# Patient Record
Sex: FEMALE | Race: ASIAN | Employment: UNEMPLOYED | URBAN - METROPOLITAN AREA
[De-identification: names, ages, dates, MRNs, and addresses within clinical notes are randomized per-mention and may not be internally consistent; named-entity substitution may affect disease eponyms.]

---

## 2017-03-09 NOTE — PROGRESS NOTES
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 She completed fourth cycle of BEP 12/2015. She presents today for a three month surveillance visit.    HPI: Dianna comes to the clinic feeling well accompanied by her boyfriend. She continues to have mild intermittent tinnitus which is not bothersome to her. She is sexually active with her boyfriend and uses a copper IUD for contraception, denies dyspareunia or post-coital bleeding. She reports normal periods with the exception of one missed period- took a pregnancy test and it was negative. She notes \"breast irritation and thickening\" to her left breast which she first started noticing this past month when she switched from underwire bras to \"breastfeeding bras\" that do not have the same support. Denies family history of breast cancer, spontaneous nipple discharge, breast pain, or breast lumps. Reports that her breasts are asymmetrical but this is not new for her.  Denies unintended weight loss, fatigue, weakness, changes in vision or hearing, shortness of breath, cough, chest pain, abdominal pain, dyspepsia, nausea, vomiting, constipation, diarrhea, bloating, dysuria, urinary frequency or urgency, hematuria, pelvic pain, lower back pain, vaginal discharge,numbness or tingling, or swelling of the extremities.     Brief Oncology History:  GYN History:  7/30/15: She was seen in the ED due to pelvic pain. History of irregular vaginal bleeding with positive pregnancy test. Further work up with US remarkable for 20cm pelvic mass    8/12/2015 CT scan:  Pelvic mass approximately 21 cm transverse, 14 cm AP and 20 cm craniocaudal dimension. This has cystic or necrotic areas within it with thick heterogeneous soft tissue rim.This is felt to be a large tumor, probably right ovarian origin. There is an approximately 6 x 5 x 6 cm left adnexal heterogeneous mass consistent with a smaller left ovarian mass " suspicious for tumor as well. Retroperitoneal adenopathy. Small amount of free fluid in the  pelvis. No pelvic adenopathy    8/12/2015 Pelvic US  The uterus measures 6.6 x 4.2 x 4.7cm. No fibroids are present. Endometrial stripe thickness is 0.6 cm. Remaining findings as above in CT scan.    8/15/15: CEA (1.8), CA-125 ( 202), LDH (2340), hCG (51), AFP (7,592)    8/14/15: CT abdomen and pelvis   IMPRESSION:   1. Very large right abdomen and pelvis and smaller left adnexal region mass with retroperitoneal adenopathy and small ascites. Findings are compatible with malignancy, likely bilateral ovarian tumor, greater on  the right. [Critical Result: Large mid to right abdominal mass and smaller 6 cm left adnexal mass concerning for ovarian malignancy with small free fluid and periaortic adenopathy concerning for metastasis.]    8/24/15: exploratory laparotomy, tumor debulking, left salpingo-oophorectomy, complete omentectomy, left pelvic and para aortic lymph node dissection, and bladder peritoneum stripping  Cytology:   CYTOLOGIC INTERPRETATION:  A. Pelvic Washings: Positive for Malignancy  - Consistent with germ cell tumor (dysgerminoma)Specimen Adequacy: Satisfactory for evaluation.  B. Diaphragm fluid, Left Washings: Negative for malignancy Specimen Adequacy: Satisfactory for evaluation.  C. Diaphragm fluid, Right Washings: Negative for malignancy Specimen Adequacy: Satisfactory for evaluation.  Pathology:  SPECIMEN(S):  A: Bladder peritoneum  B: Left ovary and fallopian tube  C: Left fallopian tube  D: Left pelvic peritoneum  E: Omentum  F: Omental nodule  G: Lymph nodes, left periaortic  H: Lymph node, left infrarenal  I: Lymph nodes, left pelvic  J: Right bladder peritoneum nodule  K: Cul-de-sac biopsy, posterior  FINAL DIAGNOSIS:  A. Peritoneum, bladder, excision:  -Metastatic malignant germ cell tumor (fragmented, 13.1 cm in aggregate)  B. 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 Fallopian tube, left, salpingectomy:  -Smooth muscle and mesothelium  -No fallopian tube identified  -Uninvolved by tumor  D. Peritoneum, left pelvic, biopsy:  -Metastatic malignant germ cell tumor  E. Omentum, omentectomy:  -Metastatic malignant germ cell tumor, size 2 mm  F. Omentum, nodule, excision:  -Metastatic malignant germ cell tumor  -Fragmented specimen (largest intact piece of tumor: 1 cm)  G. Lymph nodes, left periaortic, dissection:  -Metastatic malignant germ cell tumor in four of ten lymph nodes (4/10)  -Largest metastasis is 12 mm in greatest dimension  -Extranodal extension present  -Malignant germ cell tumor also present as detached tumor fragments, without identifiable lymph node  H. Lymph nodes, left infrarenal, dissection:  -Metastatic malignant germ cell tumor in two of five lymph nodes (2/5)  -Larger metastasis is 7 mm in greatest dimension  -Extranodal extension present  I. Lymph nodes, left pelvic, dissection:  -Metastatic malignant germ cell tumor in one of six lymph nodes (1/6)  -Metastasis is 4.5 mm in greatest dimension  J. Peritoneum, right bladder, biopsy:  -Metastatic malignant germ cell tumor (size 11 mm)  K. Peritoneum, cul de sac, biopsy:  -Metastatic malignant germ cell tumor  COMMENT:  The ovarian tumor shows a mixture of dysgerminoma (70%) and yolk sac tumor (30%). Similarly, the omental, peritoneal and lymph node metastases show both components, although yolk sac tumor composes the majority of the metastatic tumor volume. Neither embryonal carcinoma nor choriocarcinoma is identified.        Results for FREDERICK LEWIS (MRN [id number]) as of 11/13/2015 12:22   Ref. Range  8/15/2015 08:35  9/8/2015 13:25  9/14/2015 08:08  10/5/2015 08:20  10/26/2015  11/16/2015  12/14/2015      Latest Range: 0-30 U/mL   202 (H)  62 (H)  53 (H)  14  12  14  11      Results for FREDERICK LEWIS (MRN [id number]) as of 2015 12:22   Ref. Range  8/15/2015 08:35  2015 13:25  2015 08:08  10/5/2015 08:20  10/26/15  11/16/15  12/908083    Alpha Fetoprotein  Latest Range: 0-8 ug/L  7592.7 (H)  254.5 (H)  241.1 (H)  75.0 (H)  6.1  3.3  2.8        9/14/15 Cycle 1 BEP   9/29/15 Cycle 2 BEP  10/5/15: C2D5 Etoposide cisplatin, bleomycin   10/19/15: C2D15 bleomycin  10/21/15: C2D17 BEP, doing well  11/13/15: C3D17 BEP  11/23/15: C4D1 BEP   11/30/15: C4 D15 BEP  CT c/a/p  IMPRESSION:   1. Postsurgical changes status post removal of the mixed germ cell  tumor and left salpingo-oophorectomy with lymph node dissection as  above.  2. Unchanged left apical 4 mm groundglass pleural-based nodule. No  definite evidence of recurrence or metastasis in chest, abdomen, or pelvis    3/17/16: Surveillance visit. Pap NIL. , AFP 2.9,  8, and beta HCG <3  16: AFP 2.8, beta HCG <3,  10,   16: AFP 2.4, beta HCG <3,  10,   16: AFP 2.1, beta HCG <3,  18; , Pap NIL, HPV negative  3/10/17: , AFP pending, beta HCG pending,  pending    Past Medical History   Diagnosis Date     Contact dermatitis and other eczema, due to unspecified cause      Other  infants, unspecified (weight)(765.10)      born about 35 week - in NICU for 2 weeks - breathing issues.      Pelvic mass        Past Surgical History   Procedure Laterality Date     Surgical history of -        no surgeries     Laparotomy, tumor debulking, combined N/A 2015     Procedure: COMBINED LAPAROTOMY, TUMOR DEBULKING;  Surgeon: Vita Jordan MD;  Location: [facility] OR     Salpingo-oophorectomy, dissect lymph node Left 2015     Procedure: SALPINGO-OOPHORECTOMY, DISSECT LYMPH NODE;  Surgeon: Vita Jordan MD;  Location: [facility] OR     Omentectomy N/A 2015     Procedure: OMENTECTOMY;  Surgeon: Vita Jordan MD;   Location:  OR       Social History     Social History     Marital status: Single     Spouse name: N/A     Number of children: N/A     Years of education: N/A     Occupational History     Not on file.     Social History Main Topics     Smoking status: Never Smoker     Smokeless tobacco: Never Used     Alcohol use 0.0 oz/week     0 Standard drinks or equivalent per week      Comment: occasional     Drug use: No     Sexual activity: Yes     Partners: Male     Other Topics Concern     Not on file     Social History Narrative       Family History   Problem Relation Age of Onset     C.A.D. No family hx of      Hypertension No family hx of      Asthma No family hx of      Breast Cancer No family hx of      Cancer - colorectal No family hx of      Blood Disease No family hx of      no DVT/PE      Anesthesia Reaction No family hx of      DIABETES Father        Current Outpatient Prescriptions   Medication     UNABLE TO FIND     No current facility-administered medications for this visit.           Allergies   Allergen Reactions     Cats          ROS  Review of Systems:  General: Denies fatigue, changes in weight, weakness, appetite changes, night sweats, hot flashes, fever, chills, or difficulty sleeping  HEENT: Reports tinnitus. Denies headaches, hair loss, visual difficulty or disturbances, spots or floaters, diplopia, masses, head injury, tinnitus, hearing loss, epistaxis, congestion, problems with teeth or gums, dysphonia, or dysphagia  Breast: See HPI.  Pulmonary: Denies cough, sputum, hemoptysis, shortness of breath, dyspnea on exertion, wheezing, or allergies  Cardiovascular: Denies chest pain, fainting, palpitations, murmurs, activity intolerance, swelling in legs, or high blood pressure  Gastrointestinal: Denies nausea, vomiting, constipation, diarrhea, abdominal pain, bloating, heartburn, melena, hematochezia, or jaundice  Genitourinary: Denies dysuria, urinary urgency or frequency, hematuria, cloudy or  "malodorous urine, incontinence, repeat urinary tract infections, flank pain, pelvic pain, irregular vaginal bleeding, vaginal discharge, or vaginal dryness  Sexual Function: Denies pain with intercourse, changes in libido, arousal difficulty, or changes in orgasm  Integumentary: Denies rashes, sores, changing moles, or scarring  Hematologic: Denies swollen lymph nodes, masses, easy bruising, or easy bleeding  Musculoskeletal: Denies falls, back pain, myalgias, arthralgias, stiffness, muscle weakness or muscle cramps  Neurologic: Denies numbness or tingling, changes in memory, difficulty with walking, dizziness, seizures, or tremors  Psychiatric: Denies anxiety, depression, nervousness, mood changes, suicidal thoughts, or difficulty concentrating  Endocrine: Denies polydipsia, polyuria, temperature intolerance, or history of thyroid disease      OBJECTIVE:    Physical Exam:    /75  Pulse 104  Temp 97.9  F (36.6  C) (Oral)  Resp 24  Ht 1.575 m (5' 2.01\")  Wt 111.9 kg (246 lb 11.2 oz)  SpO2 96%  BMI 45.11 kg/m2    General: Alert non-toxic appearing female in no acute distress  HEENT: Normocephalic, atraumatic; PERRLA; no scleral icterus; oropharynx pink without lesions; neck supple without lymphadenopathy  Breast: Assessed in sitting and lying positions. Breasts large and pendulous, R>L. No axillary lymphadenopathy, no lumps, areas of thickening, peau d'orange, nipple retraction, or erythema, no spontaneous nipple discharge.   Pulmonary: Lungs clear to auscultation, no increased work of breathing noted  CV/PV: Regular rate and rhythm, S1S2, no clicks, murmurs, rubs, or gallops; bilateral lower extremities without edema  GI: Normoactive bowel sounds x4 quadrants, abdomen obese, soft, non-distended, and non-tender to palpation without masses or organomegaly  : External genitalia and urethral meatus pink without lesions, masses, or excoriation. Vagina pink and moist without masses or lesions. Cervix without masses " or polyps, os patent with clear discharge and visible IUD strings. Bimanual exam reveals no masses, fullness, or cervical motion tenderness. Rectovaginal exam confirms these findings.  Heme: Cervical, supraclavicular, and inguinal lymph nodes without lymphadenopathy  Neuro: Grossly intact, normal gait  Skin: Appropriate color for race, warm and dry, no rashes or lesions to unclothed skin  Psych: Pleasant and interactive, affect bright, makes appropriate eye contact, thought process linear    Labs:    AFP pending   pending  Beta HCG tumor marker pending      Assessment/Plan:  1) Stage IIIC mixed germ cell tumor, left ovary: No signs of recurrence on physical exam or history, tumor markers pending. Continue surveillance every three months x2 years (12/2017) followed by yearly surveillance visits through 12/2020 per NCCN guidelines. Reviewed signs and symptoms  of recurrence including but not limited to bleeding from vagina, bladder, or rectum, bloating, early satiety, swelling in the lower extremities, abdominal or lower back pain, changes in bowel or bladder patterns, shortness of breath, increased fatigue, unexplained weight loss, and night sweats. Reviewed signs and symptoms for when she should contact the clinic or seek additional care. Patient to contact the clinic with any questions or concerns in the interim.  2) Breast concerns: No palpable masses or concerning skin lesions on exam. We discussed importance of breast self awareness. Discussed that ill-fitting bras may cause breast discomfort and irritation- recommend that she return to wearing a supportive bra and monitor her symptoms. If she continues to have symptoms or the region she is concerned about does not improve within two weeks, she is to call the clinic and a mammogram will be ordered.  3) Genetic testing: Does not meet criteria for testing  4) Labs: LDH, AFP, , beta HCG tumor marker  5) Health maintenance issues discussed today  include to follow up with PCP for co-morbid conditions and non-gynecologic issues.  6) Patient verbalized understanding of and agreement with plan.    20 minutes face to face time spent with patient, over 50% of which was spent in counseling and coordination of care.    WEN Reagan, FNP-C  Family Nurse Practitioner  Gynecologic Oncology  [facility]  Pager: [phone]

## 2017-03-10 ENCOUNTER — ONCOLOGY VISIT (OUTPATIENT)
Dept: ONCOLOGY | Facility: CLINIC | Age: 22
End: 2017-03-10
Attending: NURSE PRACTITIONER
Payer: COMMERCIAL

## 2017-03-10 VITALS
RESPIRATION RATE: 24 BRPM | HEIGHT: 62 IN | BODY MASS INDEX: 45.4 KG/M2 | WEIGHT: 246.7 LBS | SYSTOLIC BLOOD PRESSURE: 120 MMHG | DIASTOLIC BLOOD PRESSURE: 75 MMHG | HEART RATE: 104 BPM | TEMPERATURE: 97.9 F | OXYGEN SATURATION: 96 %

## 2017-03-10 DIAGNOSIS — C56.2 OVARIAN CANCER, LEFT (H): ICD-10-CM

## 2017-03-10 DIAGNOSIS — N64.59 BREAST COMPLAINT: ICD-10-CM

## 2017-03-10 DIAGNOSIS — C80.1 MIXED GERM CELL TUMOR (H): Primary | ICD-10-CM

## 2017-03-10 LAB
AFP SERPL-MCNC: 3.7 UG/L (ref 0–8)
CANCER AG125 SERPL-ACNC: 12 U/ML (ref 0–30)
LDH SERPL L TO P-CCNC: 235 U/L (ref 81–234)

## 2017-03-10 PROCEDURE — 83615 LACTATE (LD) (LDH) ENZYME: CPT | Performed by: NURSE PRACTITIONER

## 2017-03-10 PROCEDURE — 84702 CHORIONIC GONADOTROPIN TEST: CPT | Performed by: NURSE PRACTITIONER

## 2017-03-10 PROCEDURE — 86304 IMMUNOASSAY TUMOR CA 125: CPT | Performed by: NURSE PRACTITIONER

## 2017-03-10 PROCEDURE — 82105 ALPHA-FETOPROTEIN SERUM: CPT | Performed by: NURSE PRACTITIONER

## 2017-03-10 PROCEDURE — 36415 COLL VENOUS BLD VENIPUNCTURE: CPT

## 2017-03-10 PROCEDURE — 99212 OFFICE O/P EST SF 10 MIN: CPT | Mod: ZF

## 2017-03-10 PROCEDURE — 99213 OFFICE O/P EST LOW 20 MIN: CPT | Mod: ZP | Performed by: NURSE PRACTITIONER

## 2017-03-10 ASSESSMENT — PAIN SCALES - GENERAL: PAINLEVEL: NO PAIN (0)

## 2017-03-10 NOTE — NURSING NOTE
"Dianna Vasquez is a 21 year old female who presents for:  Chief Complaint   Patient presents with     Blood Draw     Pt with hx of mixed germ cell tumor labs collected with U/S via vascular access.      Oncology Clinic Visit     return patient visit related to mixed germ cell tumor         Initial Vitals:  /75  Pulse 104  Temp 97.9  F (36.6  C) (Oral)  Resp 24  Ht 1.575 m (5' 2.01\")  Wt 111.9 kg (246 lb 11.2 oz)  SpO2 96%  BMI 45.11 kg/m2 Estimated body mass index is 45.11 kg/(m^2) as calculated from the following:    Height as of this encounter: 1.575 m (5' 2.01\").    Weight as of this encounter: 111.9 kg (246 lb 11.2 oz).. Body surface area is 2.21 meters squared. BP completed using cuff size: large  No Pain (0) No LMP recorded. Allergies and medications reviewed.     Medications: Medication refills not needed today.  Pharmacy name entered into [facility]:    [facility] - [place], [place] - [address] [place] [address] AT [facility] OF [address] [place] [address] [facility] [place], [place] - [address] [facility] - [place], [place] - [address] [facility] - MAIL ORDER ONLY - [place], [place]    Comments: patient denied pain/discomfort.     5 minutes for nursing intake (face to face time)   Isai Arias CMA      "

## 2017-03-10 NOTE — MR AVS SNAPSHOT
After Visit Summary   3/10/2017    Dianna Vasquez    MRN: [id number]           Patient Information     Date Of Birth          1995        Visit Information        Provider Department      3/10/2017 1:20 PM Rosita Waggoner APRN CNP [facility]        Today's Diagnoses     Mixed germ cell tumor    -  1    Ovarian cancer, left (H)        Breast complaint           Follow-ups after your visit        Your next 10 appointments already scheduled     Jun 02, 2017 12:30 PM CDT   [facility] Draw with [facility] LAB DRAW   [facility] Lab Draw ([facility])    [address] [place] [address] [id number] [phone]            Jun 02, 2017  1:00 PM CDT   (Arrive by 12:45 PM)   Return Visit with WEN Coello CNP   [facility] ([facility])    [address] [place] [address] [id number] [phone]              Who to contact     If you have questions or need follow up information about today's clinic visit or your schedule please contact [facility] directly at [phone].  Normal or non-critical lab and imaging results will be communicated to you by MyChart, letter or phone within 4 business days after the clinic has received the results. If you do not hear from us within 7 days, please contact the clinic through [employer] or phone. If you have a critical or abnormal lab result, we will notify you by phone as soon as possible.  Submit refill requests through [employer] or call your pharmacy and they will forward the refill request to us. Please allow 3 business days for your refill to be completed.          Additional Information About Your Visit        MyChart Information     [employer] gives you secure access to your electronic health record. If you see a primary care provider, you can also send messages to your care team and make appointments. If you have  "questions, please call your primary care clinic.  If you do not have a primary care provider, please call [phone] and they will assist you.        Care EveryWhere ID     This is your Care EveryWhere ID. This could be used by other organizations to access your [facility] medical records  [id number]        Your Vitals Were     Pulse Temperature Respirations Height Pulse Oximetry BMI (Body Mass Index)    104 97.9  F (36.6  C) (Oral) 24 1.575 m (5' 2.01\") 96% 45.11 kg/m2       Blood Pressure from Last 3 Encounters:   03/10/17 120/75   12/21/16 111/73   09/23/16 117/78    Weight from Last 3 Encounters:   03/10/17 111.9 kg (246 lb 11.2 oz)   12/21/16 106.9 kg (235 lb 9.6 oz)   09/23/16 107.7 kg (237 lb 8 oz)              We Performed the Following     AFP     B-hcg Tumor Marker          Lactate Dehydrogenase        Primary Care Provider Office Phone # Fax #    Kasey Nona Toure [phone]       [facility] [address] [facility] [address]        Thank you!     Thank you for choosing [facility]  for your care. Our goal is always to provide you with excellent care. Hearing back from our patients is one way we can continue to improve our services. Please take a few minutes to complete the written survey that you may receive in the mail after your visit with us. Thank you!             Your Updated Medication List - Protect others around you: Learn how to safely use, store and throw away your medicines at www.disposemymeds.org.          This list is accurate as of: 3/10/17  3:17 PM.  Always use your most recent med list.                   Brand Name Dispense Instructions for use    UNABLE TO FIND      Apply topically as needed MEDICATION NAME: quadriderm cream         "

## 2017-03-10 NOTE — LETTER
"3/10/2017       RE: Dianna Vasquez  [address]  [place] [address]     Dear Colleague,    Thank you for referring your patient, Dianna Vasquez, to the [facility]. Please see a copy of my visit note below.    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 She completed fourth cycle of BEP 12/2015. She presents today for a three month surveillance visit.    HPI: Dianna comes to the clinic feeling well accompanied by her boyfriend. She continues to have mild intermittent tinnitus which is not bothersome to her. She is sexually active with her boyfriend and uses a copper IUD for contraception, denies dyspareunia or post-coital bleeding. She reports normal periods with the exception of one missed period- took a pregnancy test and it was negative. She notes \"breast irritation and thickening\" to her left breast which she first started noticing this past month when she switched from underwire bras to \"breastfeeding bras\" that do not have the same support. Denies family history of breast cancer, spontaneous nipple discharge, breast pain, or breast lumps. Reports that her breasts are asymmetrical but this is not new for her.  Denies unintended weight loss, fatigue, weakness, changes in vision or hearing, shortness of breath, cough, chest pain, abdominal pain, dyspepsia, nausea, vomiting, constipation, diarrhea, bloating, dysuria, urinary frequency or urgency, hematuria, pelvic pain, lower back pain, vaginal discharge,numbness or tingling, or swelling of the extremities.     Brief Oncology History:  GYN History:  7/30/15: She was seen in the ED due to pelvic pain. History of irregular vaginal bleeding with positive pregnancy test. Further work up with US remarkable for 20cm pelvic mass    8/12/2015 CT scan:  Pelvic mass approximately 21 cm transverse, 14 cm AP and 20 cm craniocaudal dimension. This has cystic or necrotic areas " within it with thick heterogeneous soft tissue rim.This is felt to be a large tumor, probably right ovarian origin. There is an approximately 6 x 5 x 6 cm left adnexal heterogeneous mass consistent with a smaller left ovarian mass suspicious for tumor as well. Retroperitoneal adenopathy. Small amount of free fluid in the  pelvis. No pelvic adenopathy    8/12/2015 Pelvic US  The uterus measures 6.6 x 4.2 x 4.7cm. No fibroids are present. Endometrial stripe thickness is 0.6 cm. Remaining findings as above in CT scan.    8/15/15: CEA (1.8), CA-125 ( 202), LDH (2340), hCG (51), AFP (7,592)    8/14/15: CT abdomen and pelvis   IMPRESSION:   1. Very large right abdomen and pelvis and smaller left adnexal region mass with retroperitoneal adenopathy and small ascites. Findings are compatible with malignancy, likely bilateral ovarian tumor, greater on  the right. [Critical Result: Large mid to right abdominal mass and smaller 6 cm left adnexal mass concerning for ovarian malignancy with small free fluid and periaortic adenopathy concerning for metastasis.]    8/24/15: exploratory laparotomy, tumor debulking, left salpingo-oophorectomy, complete omentectomy, left pelvic and para aortic lymph node dissection, and bladder peritoneum stripping  Cytology:   CYTOLOGIC INTERPRETATION:  A. Pelvic Washings: Positive for Malignancy  - Consistent with germ cell tumor (dysgerminoma)Specimen Adequacy: Satisfactory for evaluation.  B. Diaphragm fluid, Left Washings: Negative for malignancy Specimen Adequacy: Satisfactory for evaluation.  C. Diaphragm fluid, Right Washings: Negative for malignancy Specimen Adequacy: Satisfactory for evaluation.  Pathology:  SPECIMEN(S):  A: Bladder peritoneum  B: Left ovary and fallopian tube  C: Left fallopian tube  D: Left pelvic peritoneum  E: Omentum  F: Omental nodule  G: Lymph nodes, left periaortic  H: Lymph node, left infrarenal  I: Lymph nodes, left pelvic  J: Right bladder peritoneum nodule  K:  Cul-de-sac biopsy, posterior  FINAL DIAGNOSIS:  A. Peritoneum, bladder, excision:  -Metastatic malignant germ cell tumor (fragmented, 13.1 cm in aggregate)  B. 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 Fallopian tube, left, salpingectomy:  -Smooth muscle and mesothelium  -No fallopian tube identified  -Uninvolved by tumor  D. Peritoneum, left pelvic, biopsy:  -Metastatic malignant germ cell tumor  E. Omentum, omentectomy:  -Metastatic malignant germ cell tumor, size 2 mm  F. Omentum, nodule, excision:  -Metastatic malignant germ cell tumor  -Fragmented specimen (largest intact piece of tumor: 1 cm)  G. Lymph nodes, left periaortic, dissection:  -Metastatic malignant germ cell tumor in four of ten lymph nodes (4/10)  -Largest metastasis is 12 mm in greatest dimension  -Extranodal extension present  -Malignant germ cell tumor also present as detached tumor fragments, without identifiable lymph node  H. Lymph nodes, left infrarenal, dissection:  -Metastatic malignant germ cell tumor in two of five lymph nodes (2/5)  -Larger metastasis is 7 mm in greatest dimension  -Extranodal extension present  I. Lymph nodes, left pelvic, dissection:  -Metastatic malignant germ cell tumor in one of six lymph nodes (1/6)  -Metastasis is 4.5 mm in greatest dimension  J. Peritoneum, right bladder, biopsy:  -Metastatic malignant germ cell tumor (size 11 mm)  K. Peritoneum, cul de sac, biopsy:  -Metastatic malignant germ cell tumor  COMMENT:  The ovarian tumor shows a mixture of dysgerminoma (70%) and yolk sac tumor (30%). Similarly, the omental, peritoneal and lymph node metastases show both components, although yolk sac tumor composes the majority of the metastatic tumor volume. Neither embryonal carcinoma nor choriocarcinoma is  identified.        Results for FREDERICK LEWIS (MRN [id number]) as of 2015 12:22   Ref. Range  8/15/2015 08:35  2015 13:25  2015 08:08  10/5/2015 08:20  10/26/2015  2015  2015      Latest Range: 0-30 U/mL  202 (H)  62 (H)  53 (H)  14  12  14  11      Results for FREDERICK LEWIS (MRN [id number]) as of 2015 12:22   Ref. Range  8/15/2015 08:35  2015 13:25  2015 08:08  10/5/2015 08:20  10/26/15  11/16/15  12/026240    Alpha Fetoprotein  Latest Range: 0-8 ug/L  7592.7 (H)  254.5 (H)  241.1 (H)  75.0 (H)  6.1  3.3  2.8        9/14/15 Cycle 1 BEP   9/29/15 Cycle 2 BEP  10/5/15: C2D5 Etoposide cisplatin, bleomycin   10/19/15: C2D15 bleomycin  10/21/15: C2D17 BEP, doing well  11/13/15: C3D17 BEP  11/23/15: C4D1 BEP   11/30/15: C4 D15 BEP  CT c/a/p  IMPRESSION:   1. Postsurgical changes status post removal of the mixed germ cell  tumor and left salpingo-oophorectomy with lymph node dissection as  above.  2. Unchanged left apical 4 mm groundglass pleural-based nodule. No  definite evidence of recurrence or metastasis in chest, abdomen, or pelvis    3/17/16: Surveillance visit. Pap NIL. , AFP 2.9,  8, and beta HCG <3  16: AFP 2.8, beta HCG <3,  10,   16: AFP 2.4, beta HCG <3,  10,   16: AFP 2.1, beta HCG <3,  18; , Pap NIL, HPV negative  3/10/17: , AFP pending, beta HCG pending,  pending    Past Medical History   Diagnosis Date     Contact dermatitis and other eczema, due to unspecified cause      Other  infants, unspecified (weight)(765.10)      born about 35 week - in NICU for 2 weeks - breathing issues.      Pelvic mass        Past Surgical History   Procedure Laterality Date     Surgical history of -        no surgeries     Laparotomy, tumor debulking, combined N/A 2015     Procedure: COMBINED LAPAROTOMY, TUMOR DEBULKING;  Surgeon: Vita Jordan MD;  Location: [facility] OR     Salpingo-oophorectomy,  dissect lymph node Left 8/24/2015     Procedure: SALPINGO-OOPHORECTOMY, DISSECT LYMPH NODE;  Surgeon: Vita Jordan MD;  Location: [facility] OR     Omentectomy N/A 8/24/2015     Procedure: OMENTECTOMY;  Surgeon: Vita Jordan MD;  Location: [facility] OR       Social History     Social History     Marital status: Single     Spouse name: N/A     Number of children: N/A     Years of education: N/A     Occupational History     Not on file.     Social History Main Topics     Smoking status: Never Smoker     Smokeless tobacco: Never Used     Alcohol use 0.0 oz/week     0 Standard drinks or equivalent per week      Comment: occasional     Drug use: No     Sexual activity: Yes     Partners: Male     Other Topics Concern     Not on file     Social History Narrative       Family History   Problem Relation Age of Onset     C.A.D. No family hx of      Hypertension No family hx of      Asthma No family hx of      Breast Cancer No family hx of      Cancer - colorectal No family hx of      Blood Disease No family hx of      no DVT/PE      Anesthesia Reaction No family hx of      DIABETES Father        Current Outpatient Prescriptions   Medication     UNABLE TO FIND     No current facility-administered medications for this visit.           Allergies   Allergen Reactions     Cats          ROS  Review of Systems:  General: Denies fatigue, changes in weight, weakness, appetite changes, night sweats, hot flashes, fever, chills, or difficulty sleeping  HEENT: Reports tinnitus. Denies headaches, hair loss, visual difficulty or disturbances, spots or floaters, diplopia, masses, head injury, tinnitus, hearing loss, epistaxis, congestion, problems with teeth or gums, dysphonia, or dysphagia  Breast: See HPI.  Pulmonary: Denies cough, sputum, hemoptysis, shortness of breath, dyspnea on exertion, wheezing, or allergies  Cardiovascular: Denies chest pain, fainting, palpitations, murmurs, activity intolerance, swelling in legs, or high blood  "pressure  Gastrointestinal: Denies nausea, vomiting, constipation, diarrhea, abdominal pain, bloating, heartburn, melena, hematochezia, or jaundice  Genitourinary: Denies dysuria, urinary urgency or frequency, hematuria, cloudy or malodorous urine, incontinence, repeat urinary tract infections, flank pain, pelvic pain, irregular vaginal bleeding, vaginal discharge, or vaginal dryness  Sexual Function: Denies pain with intercourse, changes in libido, arousal difficulty, or changes in orgasm  Integumentary: Denies rashes, sores, changing moles, or scarring  Hematologic: Denies swollen lymph nodes, masses, easy bruising, or easy bleeding  Musculoskeletal: Denies falls, back pain, myalgias, arthralgias, stiffness, muscle weakness or muscle cramps  Neurologic: Denies numbness or tingling, changes in memory, difficulty with walking, dizziness, seizures, or tremors  Psychiatric: Denies anxiety, depression, nervousness, mood changes, suicidal thoughts, or difficulty concentrating  Endocrine: Denies polydipsia, polyuria, temperature intolerance, or history of thyroid disease      OBJECTIVE:    Physical Exam:    /75  Pulse 104  Temp 97.9  F (36.6  C) (Oral)  Resp 24  Ht 1.575 m (5' 2.01\")  Wt 111.9 kg (246 lb 11.2 oz)  SpO2 96%  BMI 45.11 kg/m2    General: Alert non-toxic appearing female in no acute distress  HEENT: Normocephalic, atraumatic; PERRLA; no scleral icterus; oropharynx pink without lesions; neck supple without lymphadenopathy  Breast: Assessed in sitting and lying positions. Breasts large and pendulous, R>L. No axillary lymphadenopathy, no lumps, areas of thickening, peau d'orange, nipple retraction, or erythema, no spontaneous nipple discharge.   Pulmonary: Lungs clear to auscultation, no increased work of breathing noted  CV/PV: Regular rate and rhythm, S1S2, no clicks, murmurs, rubs, or gallops; bilateral lower extremities without edema  GI: Normoactive bowel sounds x4 quadrants, abdomen obese, soft, " non-distended, and non-tender to palpation without masses or organomegaly  : External genitalia and urethral meatus pink without lesions, masses, or excoriation. Vagina pink and moist without masses or lesions. Cervix without masses or polyps, os patent with clear discharge and visible IUD strings. Bimanual exam reveals no masses, fullness, or cervical motion tenderness. Rectovaginal exam confirms these findings.  Heme: Cervical, supraclavicular, and inguinal lymph nodes without lymphadenopathy  Neuro: Grossly intact, normal gait  Skin: Appropriate color for race, warm and dry, no rashes or lesions to unclothed skin  Psych: Pleasant and interactive, affect bright, makes appropriate eye contact, thought process linear    Labs:    AFP pending   pending  Beta HCG tumor marker pending      Assessment/Plan:  1) Stage IIIC mixed germ cell tumor, left ovary: No signs of recurrence on physical exam or history, tumor markers pending. Continue surveillance every three months x2 years (12/2017) followed by yearly surveillance visits through 12/2020 per NCCN guidelines. Reviewed signs and symptoms  of recurrence including but not limited to bleeding from vagina, bladder, or rectum, bloating, early satiety, swelling in the lower extremities, abdominal or lower back pain, changes in bowel or bladder patterns, shortness of breath, increased fatigue, unexplained weight loss, and night sweats. Reviewed signs and symptoms for when she should contact the clinic or seek additional care. Patient to contact the clinic with any questions or concerns in the interim.  2) Breast concerns: No palpable masses or concerning skin lesions on exam. We discussed importance of breast self awareness. Discussed that ill-fitting bras may cause breast discomfort and irritation- recommend that she return to wearing a supportive bra and monitor her symptoms. If she continues to have symptoms or the region she is concerned about does not  improve within two weeks, she is to call the clinic and a mammogram will be ordered.  3) Genetic testing: Does not meet criteria for testing  4) Labs: LDH, AFP, , beta HCG tumor marker  5) Health maintenance issues discussed today include to follow up with PCP for co-morbid conditions and non-gynecologic issues.  6) Patient verbalized understanding of and agreement with plan.    20 minutes face to face time spent with patient, over 50% of which was spent in counseling and coordination of care.    WEN Reagan, FNP-C  Family Nurse Practitioner  Gynecologic Oncology  [facility]  Pager: [phone]

## 2017-03-10 NOTE — NURSING NOTE
Chief Complaint   Patient presents with     Blood Draw     Pt with hx of mixed germ cell tumor labs collected with U/S via vascular access.

## 2017-03-13 LAB — HCG-TM SERPL-ACNC: NORMAL IU/L

## 2017-06-01 NOTE — PROGRESS NOTES
Follow Up Notes on Referred Patient    Date: 2017       Dr. Kasey Toure MD  [facility]  [address] [place] [address]       RE: Dianna Vasquez  : 1995  LUIS M: 2017    Dear Dr. 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 She completed four cycles of BEP 2015. She presents today for a surveillance visit.     Brief Oncology History:  GYN History:  7/30/15: She was seen in the ED due to pelvic pain. History of irregular vaginal bleeding with positive pregnancy test. Further work up with US remarkable for 20cm pelvic mass     2015 CT scan:  Pelvic mass approximately 21 cm transverse, 14 cm AP and 20 cm craniocaudal dimension. This has cystic or necrotic areas within it with thick heterogeneous soft tissue rim.This is felt to be a large tumor, probably right ovarian origin. There is an approximately 6 x 5 x 6 cm left adnexal heterogeneous mass consistent with a smaller left ovarian mass suspicious for tumor as well. Retroperitoneal adenopathy. Small amount of free fluid in the  pelvis. No pelvic adenopathy     2015 Pelvic US  The uterus measures 6.6 x 4.2 x 4.7cm. No fibroids are present. Endometrial stripe thickness is 0.6 cm. Remaining findings as above in CT scan.     8/15/15: CEA (1.8), CA-125 (202), LDH (2340), hCG (51), AFP (7,592)     8/14/15: CT abdomen and pelvis IMPRESSION:    1. Very large right abdomen and pelvis and smaller left adnexal region mass with retroperitoneal adenopathy and small ascites. Findings are compatible with malignancy, likely bilateral ovarian tumor, greater on  the right. [Critical Result: Large mid to right abdominal mass and smaller 6 cm left adnexal mass concerning for ovarian malignancy with small free fluid and periaortic adenopathy concerning for metastasis.]     8/24/15: exploratory laparotomy, tumor debulking, left salpingo-oophorectomy, complete  omentectomy, left pelvic and para aortic lymph node dissection, and bladder peritoneum stripping  Cytology:    CYTOLOGIC INTERPRETATION:  A. Pelvic Washings: Positive for Malignancy  - Consistent with germ cell tumor (dysgerminoma)Specimen Adequacy: Satisfactory for evaluation.  B. Diaphragm fluid, Left Washings: Negative for malignancy Specimen Adequacy: Satisfactory for evaluation.  C. Diaphragm fluid, Right Washings: Negative for malignancy Specimen Adequacy: Satisfactory for evaluation.  Pathology:  SPECIMEN(S):  A: Bladder peritoneum  B: Left ovary and fallopian tube  C: Left fallopian tube  D: Left pelvic peritoneum  E: Omentum  F: Omental nodule  G: Lymph nodes, left periaortic  H: Lymph node, left infrarenal  I: Lymph nodes, left pelvic  J: Right bladder peritoneum nodule  K: Cul-de-sac biopsy, posterior  FINAL DIAGNOSIS:  A. Peritoneum, bladder, excision:  -Metastatic malignant germ cell tumor (fragmented, 13.1 cm in aggregate)  B. 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 Fallopian tube, left, salpingectomy:  -Smooth muscle and mesothelium  -No fallopian tube identified  -Uninvolved by tumor  D. Peritoneum, left pelvic, biopsy:  -Metastatic malignant germ cell tumor  E. Omentum, omentectomy:  -Metastatic malignant germ cell tumor, size 2 mm  F. Omentum, nodule, excision:  -Metastatic malignant germ cell tumor  -Fragmented specimen (largest intact piece of tumor: 1 cm)  G. Lymph nodes, left periaortic, dissection:  -Metastatic malignant germ cell tumor in four of ten lymph nodes (4/10)  -Largest metastasis is 12 mm in greatest dimension  -Extranodal extension present  -Malignant germ cell tumor also present as detached tumor fragments, without identifiable lymph node  H. Lymph nodes, left infrarenal,  dissection:  -Metastatic malignant germ cell tumor in two of five lymph nodes (2/5)  -Larger metastasis is 7 mm in greatest dimension  -Extranodal extension present  I. Lymph nodes, left pelvic, dissection:  -Metastatic malignant germ cell tumor in one of six lymph nodes (1/6)  -Metastasis is 4.5 mm in greatest dimension  J. Peritoneum, right bladder, biopsy:  -Metastatic malignant germ cell tumor (size 11 mm)  K. Peritoneum, cul de sac, biopsy:  -Metastatic malignant germ cell tumor  COMMENT:  The ovarian tumor shows a mixture of dysgerminoma (70%) and yolk sac tumor (30%). Similarly, the omental, peritoneal and lymph node metastases show both components, although yolk sac tumor composes the majority of the metastatic tumor volume. Neither embryonal carcinoma nor choriocarcinoma is identified.  9/14/15-11/23/15 Cycle #1-4 BEP         CT c/a/p IMPRESSION:    1. Postsurgical changes status post removal of the mixed germ cell tumor and left salpingo-oophorectomy with lymph node dissection as above.  2. Unchanged left apical 4 mm groundglass pleural-based nodule. No definite evidence of recurrence or metastasis in chest, abdomen, or pelvis     3/17/16: Surveillance visit. Pap NIL. LDH not done, AFP  2.9,   8, and beta HCG  <3.  6/6/16: Chest port removed.  6/24/16: , AFP 2.8,   10,  Beta HCG <3  9/23/16: , AFP  2.4,   10,  Beta HCG <3  12/21/16: LDH  236, AFP  2.1,   18, Beta HCG <3. Pap NIL.   3/10/17: LDH  235, AFP  3.7,   12, Beta HCG <3  6/2/17: LDH, AFP, , Beta HCG pending      Today she comes to clinic feeling well and denies any new or concerning issues. She is currently having her menses. She is sexually active, denies any issues, is not using a condom, and has an IUD in place. She denies any vaginal bleeding, no changes in her bowel or bladder habits, no nausea/emesis, no lower extremity edema, and no difficulties eating or sleeping. She denies any abdominal  discomfort/bloating, no fevers or chills, and no chest pain or shortness of breath. The ringing in her ears is the same and not new. She is due to see her PCP. She reports her left breast concern is still present since her last visit; she has changed her bra type since that time.         Review of Systems:    Systemic           no weight changes; no fever; no chills; no night sweats; no appetite changes  Skin           no rashes, or lesions  Eye           no irritation; no changes in vision  Christina-Laryngeal           no dysphagia; no hoarseness   Pulmonary    no cough; no shortness of breath  Cardiovascular    no chest pain; no palpitations  Gastrointestinal    no diarrhea; no constipation; no abdominal pain; no changes in bowel habits; no blood in stool  Genitourinary   no urinary frequency; no urinary urgency; no dysuria; no pain; no abnormal vaginal discharge; no abnormal vaginal bleeding  Breast    no breast discharge; no breast changes; no breast pain  Musculoskeletal    no myalgias; no arthralgias; no back pain  Psychiatric           no depressed mood; no anxiety    Hematologic               no tender lymph nodes; no noticeable swellings or lumps   Endocrine    no hot flashes; no heat/cold intolerance         Neurological   no tremor; no numbness and tingling; no headaches; no difficulty sleeping      Past Medical History:    Past Medical History:   Diagnosis Date     Contact dermatitis and other eczema, due to unspecified cause      Other  infants, unspecified (weight)(765.10)     born about 35 week - in NICU for 2 weeks - breathing issues.      Pelvic mass          Past Surgical History:    Past Surgical History:   Procedure Laterality Date     LAPAROTOMY, TUMOR DEBULKING, COMBINED N/A 2015    Procedure: COMBINED LAPAROTOMY, TUMOR DEBULKING;  Surgeon: Vita Jordan MD;  Location: [facility] OR     OMENTECTOMY N/A 2015    Procedure: OMENTECTOMY;  Surgeon: Vita Jordan MD;  Location: [facility] OR      "SALPINGO-OOPHORECTOMY, DISSECT LYMPH NODE Left 8/24/2015    Procedure: SALPINGO-OOPHORECTOMY, DISSECT LYMPH NODE;  Surgeon: Vita Jordan MD;  Location:  OR     SURGICAL HISTORY OF -       no surgeries         Health Maintenance Due   Topic Date Due     CHLAMYDIA SCREENING  1993     HPV IMMUNIZATION (1 of 3 - Female 3 Dose Series) 09/16/2004     TETANUS IMMUNIZATION (SYSTEM ASSIGNED)  07/28/2016       Current Medications:     Current Outpatient Prescriptions   Medication Sig Dispense Refill     UNABLE TO FIND Apply topically as needed MEDICATION NAME: quadriderm cream           Allergies:        Allergies   Allergen Reactions     Cats         Social History:     Social History   Substance Use Topics     Smoking status: Never Smoker     Smokeless tobacco: Never Used     Alcohol use 0.0 oz/week     0 Standard drinks or equivalent per week      Comment: occasional       History   Drug Use No         Family History:       Family History   Problem Relation Age of Onset     C.A.D. No family hx of      Hypertension No family hx of      Asthma No family hx of      Breast Cancer No family hx of      Cancer - colorectal No family hx of      Blood Disease No family hx of      no DVT/PE      Anesthesia Reaction No family hx of      DIABETES Father          Physical Exam:     /75  Pulse 73  Temp 97.9  F (36.6  C) (Oral)  Resp 18  Ht 1.575 m (5' 2.01\")  Wt 115.1 kg (253 lb 12.8 oz)  LMP 05/31/2017 (Exact Date)  SpO2 96%  BMI 46.41 kg/m2  Body mass index is 46.41 kg/(m^2).    General Appearance: healthy and alert, no distress     HEENT: no thyromegaly, no palpable nodules or masses        Cardiovascular: regular rate and rhythm, no gallops, rubs or murmurs     Respiratory: lungs clear, no rales, rhonchi or wheezes, normal diaphragmatic excursion    Musculoskeletal: extremities non tender and without edema    Skin: no lesions or rashes     Breast:    Pendulous breasts R>L; left breast with some irregular " contouring most notable in forward leaning, some dryness along lateral bra line    Neurological: normal gait, no gross defects     Psychiatric: appropriate mood and affect                               Hematological: normal cervical, supraclavicular and inguinal lymph nodes     Gastrointestinal:       abdomen soft, non-tender, non-distended, no organomegaly or masses    Genitourinary: External genitalia and urethral meatus appears normal.  Vagina is smooth without nodularity or masses; menses..  Cervix appears normal and without lesions; IUD.  Bimanual exam reveal no masses, nodularity or fullness.        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 She completed four cycles of BEP 12/2015. She presents today for a surveillance visit.    20 minutes were spent with this patient, over 50% of that time was spent in symptom management, treatment planning and in counseling and coordination of care.      Plan:     1.)        Patient to RTC in 3 months for her next surveillance visit and labs. Today's labs are pending; discussed her labs may be elevated due to having her menses. Per [facility], imaging is not indicated unless clinically relevant. Reviewed signs and symptoms for when she should contact the clinic or seek additional care. Patient to contact the clinic with any questions or concerns in the interim.     2.) Genetic risk factors were assessed and the patient does not meet the qualifications for a referral.      3.) Labs and/or tests ordered include:  LDH, AFP, , Beta HCG.      4.) Health maintenance issues addressed today include annual health maintenance and non-gynecologic issues with PCP. Encouraged to speak with PCP about getting a mammogram for further evaluation; she is declining having a mammogram here.     WEN Ontiveros, WHNP-BC, ANP-BC  Women's Health Nurse Practitioner  Adult Nurse Pracitioner  Gynecologic  Oncology          CC  Patient Care Team:  Kasey Toure MD as PCP - General (Family Practice)  KASEY TOURE

## 2017-06-02 ENCOUNTER — APPOINTMENT (OUTPATIENT)
Dept: LAB | Facility: CLINIC | Age: 22
End: 2017-06-02
Attending: NURSE PRACTITIONER
Payer: COMMERCIAL

## 2017-06-02 ENCOUNTER — ONCOLOGY VISIT (OUTPATIENT)
Dept: ONCOLOGY | Facility: CLINIC | Age: 22
End: 2017-06-02
Attending: NURSE PRACTITIONER
Payer: COMMERCIAL

## 2017-06-02 VITALS
TEMPERATURE: 97.9 F | HEART RATE: 73 BPM | OXYGEN SATURATION: 96 % | BODY MASS INDEX: 46.7 KG/M2 | SYSTOLIC BLOOD PRESSURE: 110 MMHG | RESPIRATION RATE: 18 BRPM | DIASTOLIC BLOOD PRESSURE: 75 MMHG | WEIGHT: 253.8 LBS | HEIGHT: 62 IN

## 2017-06-02 DIAGNOSIS — Z08 ENCOUNTER FOR FOLLOW-UP SURVEILLANCE OF OVARIAN CANCER: ICD-10-CM

## 2017-06-02 DIAGNOSIS — C80.1 MIXED GERM CELL TUMOR (H): Primary | ICD-10-CM

## 2017-06-02 DIAGNOSIS — Z97.5 IUD (INTRAUTERINE DEVICE) IN PLACE: ICD-10-CM

## 2017-06-02 DIAGNOSIS — C56.2 OVARIAN CANCER, LEFT (H): ICD-10-CM

## 2017-06-02 DIAGNOSIS — Z85.43 ENCOUNTER FOR FOLLOW-UP SURVEILLANCE OF OVARIAN CANCER: ICD-10-CM

## 2017-06-02 LAB
AFP SERPL-MCNC: NORMAL UG/L (ref 0–8)
CANCER AG125 SERPL-ACNC: 11 U/ML (ref 0–30)
LDH SERPL L TO P-CCNC: 313 U/L (ref 81–234)

## 2017-06-02 PROCEDURE — 36415 COLL VENOUS BLD VENIPUNCTURE: CPT

## 2017-06-02 PROCEDURE — 86304 IMMUNOASSAY TUMOR CA 125: CPT | Performed by: NURSE PRACTITIONER

## 2017-06-02 PROCEDURE — 82105 ALPHA-FETOPROTEIN SERUM: CPT | Performed by: NURSE PRACTITIONER

## 2017-06-02 PROCEDURE — 99213 OFFICE O/P EST LOW 20 MIN: CPT | Mod: ZP | Performed by: NURSE PRACTITIONER

## 2017-06-02 PROCEDURE — 83615 LACTATE (LD) (LDH) ENZYME: CPT | Performed by: NURSE PRACTITIONER

## 2017-06-02 PROCEDURE — 99212 OFFICE O/P EST SF 10 MIN: CPT | Mod: ZF

## 2017-06-02 PROCEDURE — 84702 CHORIONIC GONADOTROPIN TEST: CPT | Performed by: NURSE PRACTITIONER

## 2017-06-02 ASSESSMENT — PAIN SCALES - GENERAL: PAINLEVEL: NO PAIN (0)

## 2017-06-02 NOTE — MR AVS SNAPSHOT
After Visit Summary   6/2/2017    Dianna Vasquez    MRN: [id number]           Patient Information     Date Of Birth          1995        Visit Information        Provider Department      6/2/2017 1:00 PM Ayla Liz APRN CNP [facility]        Today's Diagnoses     Mixed germ cell tumor    -  1    Ovarian cancer, left (H)        Encounter for follow-up surveillance of ovarian cancer        IUD (intrauterine device) in place           Follow-ups after your visit        Follow-up notes from your care team     Return in about 3 months (around 9/2/2017).      Your next 10 appointments already scheduled     Sep 08, 2017  1:20 PM CDT   (Arrive by 1:05 PM)   Return Visit with WEN Reagan CNP   [facility] ([facility])    [address] [place] [id number] [phone]              Future tests that were ordered for you today     Open Standing Orders        Priority Remaining Interval Expires Ordered     Routine 4/4 6/2/2018 6/2/2017    AFP tumor marker Routine 4/4 6/2/2018 6/2/2017    B-hcg Tumor Marker Routine 4/4 6/2/2018 6/2/2017    Lactate Dehydrogenase Routine 4/4 6/2/2018 6/2/2017            Who to contact     If you have questions or need follow up information about today's clinic visit or your schedule please contact [facility] directly at [phone].  Normal or non-critical lab and imaging results will be communicated to you by MyChart, letter or phone within 4 business days after the clinic has received the results. If you do not hear from us within 7 days, please contact the clinic through MyChart or phone. If you have a critical or abnormal lab result, we will notify you by phone as soon as possible.  Submit refill requests through [employer] or call your pharmacy and they will forward the refill request to us. Please allow 3 business days for your refill to  "be completed.          Additional Information About Your Visit        [employer] Information     [employer] gives you secure access to your electronic health record. If you see a primary care provider, you can also send messages to your care team and make appointments. If you have questions, please call your primary care clinic.  If you do not have a primary care provider, please call [phone] and they will assist you.        Care EveryWhere ID     This is your Care EveryWhere ID. This could be used by other organizations to access your [facility] medical records  [id number]        Your Vitals Were     Pulse Temperature Respirations Height Last Period Pulse Oximetry    73 97.9  F (36.6  C) (Oral) 18 1.575 m (5' 2.01\") 05/31/2017 (Exact Date) 96%    BMI (Body Mass Index)                   46.41 kg/m2            Blood Pressure from Last 3 Encounters:   06/02/17 110/75   03/10/17 120/75   12/21/16 111/73    Weight from Last 3 Encounters:   06/02/17 115.1 kg (253 lb 12.8 oz)   03/10/17 111.9 kg (246 lb 11.2 oz)   12/21/16 106.9 kg (235 lb 9.6 oz)              We Performed the Following     AFP     B-hcg Tumor Marker          Lactate Dehydrogenase        Primary Care Provider Office Phone # Fax #    Kasey Nona Toure [phone]       [facility] [address] [facility] [address]        Thank you!     Thank you for choosing [facility]  for your care. Our goal is always to provide you with excellent care. Hearing back from our patients is one way we can continue to improve our services. Please take a few minutes to complete the written survey that you may receive in the mail after your visit with us. Thank you!             Your Updated Medication List - Protect others around you: Learn how to safely use, store and throw away your medicines at www.disposemymeds.org.          This list is accurate as of: 6/2/17  3:11 PM.  Always use your most recent med list.          "          Brand Name Dispense Instructions for use    UNABLE TO FIND      Apply topically as needed MEDICATION NAME: quadriderm cream

## 2017-06-02 NOTE — LETTER
2017       RE: Dianna Vasquez  [address]  [place] [address]     Dear Colleague,    Thank you for referring your patient, Dianna Vasquez, to the [facility]. Please see a copy of my visit note below.                Follow Up Notes on Referred Patient    Date: 2017       Dr. Kasey Toure MD  [facility]  [address] [place] [address]       RE: Dianna Vasquez  : 1995  LUIS M: 2017    Dear Dr. 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 She completed four cycles of BEP 2015. She presents today for a surveillance visit.     Brief Oncology History:  GYN History:  7/30/15: She was seen in the ED due to pelvic pain. History of irregular vaginal bleeding with positive pregnancy test. Further work up with US remarkable for 20cm pelvic mass     2015 CT scan:  Pelvic mass approximately 21 cm transverse, 14 cm AP and 20 cm craniocaudal dimension. This has cystic or necrotic areas within it with thick heterogeneous soft tissue rim.This is felt to be a large tumor, probably right ovarian origin. There is an approximately 6 x 5 x 6 cm left adnexal heterogeneous mass consistent with a smaller left ovarian mass suspicious for tumor as well. Retroperitoneal adenopathy. Small amount of free fluid in the  pelvis. No pelvic adenopathy     2015 Pelvic US  The uterus measures 6.6 x 4.2 x 4.7cm. No fibroids are present. Endometrial stripe thickness is 0.6 cm. Remaining findings as above in CT scan.     8/15/15: CEA (1.8), CA-125 (202), LDH (2340), hCG (51), AFP (7,592)     8/14/15: CT abdomen and pelvis IMPRESSION:    1. Very large right abdomen and pelvis and smaller left adnexal region mass with retroperitoneal adenopathy and small ascites. Findings are compatible with malignancy, likely bilateral ovarian tumor, greater on  the right. [Critical Result: Large mid to right abdominal mass and smaller 6  cm left adnexal mass concerning for ovarian malignancy with small free fluid and periaortic adenopathy concerning for metastasis.]     8/24/15: exploratory laparotomy, tumor debulking, left salpingo-oophorectomy, complete omentectomy, left pelvic and para aortic lymph node dissection, and bladder peritoneum stripping  Cytology:    CYTOLOGIC INTERPRETATION:  A. Pelvic Washings: Positive for Malignancy  - Consistent with germ cell tumor (dysgerminoma)Specimen Adequacy: Satisfactory for evaluation.  B. Diaphragm fluid, Left Washings: Negative for malignancy Specimen Adequacy: Satisfactory for evaluation.  C. Diaphragm fluid, Right Washings: Negative for malignancy Specimen Adequacy: Satisfactory for evaluation.  Pathology:  SPECIMEN(S):  A: Bladder peritoneum  B: Left ovary and fallopian tube  C: Left fallopian tube  D: Left pelvic peritoneum  E: Omentum  F: Omental nodule  G: Lymph nodes, left periaortic  H: Lymph node, left infrarenal  I: Lymph nodes, left pelvic  J: Right bladder peritoneum nodule  K: Cul-de-sac biopsy, posterior  FINAL DIAGNOSIS:  A. Peritoneum, bladder, excision:  -Metastatic malignant germ cell tumor (fragmented, 13.1 cm in aggregate)  B. 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 Fallopian tube, left, salpingectomy:  -Smooth muscle and mesothelium  -No fallopian tube identified  -Uninvolved by tumor  D. Peritoneum, left pelvic, biopsy:  -Metastatic malignant germ cell tumor  E. Omentum, omentectomy:  -Metastatic malignant germ cell tumor, size 2 mm  F. Omentum, nodule, excision:  -Metastatic malignant germ cell tumor  -Fragmented specimen (largest intact piece of tumor: 1 cm)  G. Lymph nodes, left periaortic, dissection:  -Metastatic malignant germ cell tumor in four of ten lymph nodes  (4/10)  -Largest metastasis is 12 mm in greatest dimension  -Extranodal extension present  -Malignant germ cell tumor also present as detached tumor fragments, without identifiable lymph node  H. Lymph nodes, left infrarenal, dissection:  -Metastatic malignant germ cell tumor in two of five lymph nodes (2/5)  -Larger metastasis is 7 mm in greatest dimension  -Extranodal extension present  I. Lymph nodes, left pelvic, dissection:  -Metastatic malignant germ cell tumor in one of six lymph nodes (1/6)  -Metastasis is 4.5 mm in greatest dimension  J. Peritoneum, right bladder, biopsy:  -Metastatic malignant germ cell tumor (size 11 mm)  K. Peritoneum, cul de sac, biopsy:  -Metastatic malignant germ cell tumor  COMMENT:  The ovarian tumor shows a mixture of dysgerminoma (70%) and yolk sac tumor (30%). Similarly, the omental, peritoneal and lymph node metastases show both components, although yolk sac tumor composes the majority of the metastatic tumor volume. Neither embryonal carcinoma nor choriocarcinoma is identified.  9/14/15-11/23/15 Cycle #1-4 BEP         CT c/a/p IMPRESSION:    1. Postsurgical changes status post removal of the mixed germ cell tumor and left salpingo-oophorectomy with lymph node dissection as above.  2. Unchanged left apical 4 mm groundglass pleural-based nodule. No definite evidence of recurrence or metastasis in chest, abdomen, or pelvis     3/17/16: Surveillance visit. Pap NIL. LDH not done, AFP  2.9,   8, and beta HCG  <3.  6/6/16: Chest port removed.  6/24/16: , AFP 2.8,   10,   Beta HCG <3  9/23/16: , AFP  2.4,   10,  Beta HCG <3  12/21/16: LDH  236, AFP  2.1,   18, Beta HCG <3. Pap NIL.   3/10/17: LDH  235, AFP  3.7,   12, Beta HCG <3  6/2/17: LDH, AFP, , Beta HCG pending      Today she comes to clinic feeling well and denies any new or concerning issues. She is currently having her menses. She is sexually active, denies any issues, is not  using a condom, and has an IUD in place. She denies any vaginal bleeding, no changes in her bowel or bladder habits, no nausea/emesis, no lower extremity edema, and no difficulties eating or sleeping. She denies any abdominal discomfort/bloating, no fevers or chills, and no chest pain or shortness of breath. The ringing in her ears is the same and not new. She is due to see her PCP. She reports her left breast concern is still present since her last visit; she has changed her bra type since that time.         Review of Systems:    Systemic           no weight changes; no fever; no chills; no night sweats; no appetite changes  Skin           no rashes, or lesions  Eye           no irritation; no changes in vision  Christina-Laryngeal           no dysphagia; no hoarseness   Pulmonary    no cough; no shortness of breath  Cardiovascular    no chest pain; no palpitations  Gastrointestinal    no diarrhea; no constipation; no abdominal pain; no changes in bowel habits; no blood in stool  Genitourinary   no urinary frequency; no urinary urgency; no dysuria; no pain; no abnormal vaginal discharge; no abnormal vaginal bleeding  Breast    no breast discharge; no breast changes; no breast pain  Musculoskeletal    no myalgias; no arthralgias; no back pain  Psychiatric           no depressed mood; no anxiety    Hematologic               no tender lymph nodes; no noticeable swellings or lumps   Endocrine    no hot flashes; no heat/cold intolerance         Neurological   no tremor; no numbness and tingling; no headaches; no difficulty sleeping      Past Medical History:    Past Medical History:   Diagnosis Date     Contact dermatitis and other eczema, due to unspecified cause      Other  infants, unspecified (weight)(765.10)     born about 35 week - in NICU for 2 weeks - breathing issues.      Pelvic mass          Past Surgical History:    Past Surgical History:   Procedure Laterality Date     LAPAROTOMY, TUMOR DEBULKING, COMBINED  "N/A 8/24/2015    Procedure: COMBINED LAPAROTOMY, TUMOR DEBULKING;  Surgeon: Vita Jordan MD;  Location: [facility] OR     OMENTECTOMY N/A 8/24/2015    Procedure: OMENTECTOMY;  Surgeon: Vita Jordan MD;  Location: [facility] OR     SALPINGO-OOPHORECTOMY, DISSECT LYMPH NODE Left 8/24/2015    Procedure: SALPINGO-OOPHORECTOMY, DISSECT LYMPH NODE;  Surgeon: Vita Jordan MD;  Location: [facility] OR     SURGICAL HISTORY OF -       no surgeries         Health Maintenance Due   Topic Date Due     CHLAMYDIA SCREENING  1993     HPV IMMUNIZATION (1 of 3 - Female 3 Dose Series) 09/16/2004     TETANUS IMMUNIZATION (SYSTEM ASSIGNED)  07/28/2016       Current Medications:     Current Outpatient Prescriptions   Medication Sig Dispense Refill     UNABLE TO FIND Apply topically as needed MEDICATION NAME: quadriderm cream           Allergies:        Allergies   Allergen Reactions     Cats         Social History:     Social History   Substance Use Topics     Smoking status: Never Smoker     Smokeless tobacco: Never Used     Alcohol use 0.0 oz/week     0 Standard drinks or equivalent per week      Comment: occasional       History   Drug Use No         Family History:       Family History   Problem Relation Age of Onset     C.A.D. No family hx of      Hypertension No family hx of      Asthma No family hx of      Breast Cancer No family hx of      Cancer - colorectal No family hx of      Blood Disease No family hx of      no DVT/PE      Anesthesia Reaction No family hx of      DIABETES Father          Physical Exam:     /75  Pulse 73  Temp 97.9  F (36.6  C) (Oral)  Resp 18  Ht 1.575 m (5' 2.01\")  Wt 115.1 kg (253 lb 12.8 oz)  LMP 05/31/2017 (Exact Date)  SpO2 96%  BMI 46.41 kg/m2  Body mass index is 46.41 kg/(m^2).    General Appearance: healthy and alert, no distress     HEENT: no thyromegaly, no palpable nodules or masses        Cardiovascular: regular rate and rhythm, no gallops, rubs or murmurs     Respiratory: lungs clear, " no rales, rhonchi or wheezes, normal diaphragmatic excursion    Musculoskeletal: extremities non tender and without edema    Skin: no lesions or rashes     Breast:    Pendulous breasts R>L; left breast with some irregular contouring most notable in forward leaning, some dryness along lateral bra line    Neurological: normal gait, no gross defects     Psychiatric: appropriate mood and affect                               Hematological: normal cervical, supraclavicular and inguinal lymph nodes     Gastrointestinal:       abdomen soft, non-tender, non-distended, no organomegaly or masses    Genitourinary: External genitalia and urethral meatus appears normal.  Vagina is smooth without nodularity or masses; menses..  Cervix appears normal and without lesions; IUD.  Bimanual exam reveal no masses, nodularity or fullness.        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 She completed four cycles of BEP 12/2015. She presents today for a surveillance visit.    20 minutes were spent with this patient, over 50% of that time was spent in symptom management, treatment planning and in counseling and coordination of care.      Plan:     1.)        Patient to RTC in 3 months for her next surveillance visit and labs. Today's labs are pending; discussed her labs may be elevated due to having her menses. Per [facility], imaging is not indicated unless clinically relevant. Reviewed signs and symptoms for when she should contact the clinic or seek additional care. Patient to contact the clinic with any questions or concerns in the interim.     2.) Genetic risk factors were assessed and the patient does not meet the qualifications for a referral.      3.) Labs and/or tests ordered include:  LDH, AFP, , Beta HCG.      4.) Health maintenance issues addressed today include annual health maintenance and non-gynecologic issues with PCP. Encouraged to speak with PCP about  getting a mammogram for further evaluation; she is declining having a mammogram here.     WEN Ontiveros, WHNP-BC, ANP-BC  Women's Health Nurse Practitioner  Adult Nurse Pracitioner  Gynecologic Oncology          CC  Patient Care Team:  Kasey Toure MD as PCP - General (Family Practice)  KASEY TOURE    Again, thank you for allowing me to participate in the care of your patient.      Sincerely,    WEN Coello CNP

## 2017-06-02 NOTE — LETTER
2017      RE: Dianna Vasquez  [address]  [place] [address]                   Follow Up Notes on Referred Patient    Date: 2017       Dr. Kasey Toure MD  [facility]  [address]  [place] [address]       RE: Dianna Vasquez  : 1995  LUIS M: 2017    Dear Dr. 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 She completed four cycles of BEP 2015. She presents today for a surveillance visit.     Brief Oncology History:  GYN History:  7/30/15: She was seen in the ED due to pelvic pain. History of irregular vaginal bleeding with positive pregnancy test. Further work up with US remarkable for 20cm pelvic mass     2015 CT scan:  Pelvic mass approximately 21 cm transverse, 14 cm AP and 20 cm craniocaudal dimension. This has cystic or necrotic areas within it with thick heterogeneous soft tissue rim.This is felt to be a large tumor, probably right ovarian origin. There is an approximately 6 x 5 x 6 cm left adnexal heterogeneous mass consistent with a smaller left ovarian mass suspicious for tumor as well. Retroperitoneal adenopathy. Small amount of free fluid in the  pelvis. No pelvic adenopathy     2015 Pelvic US  The uterus measures 6.6 x 4.2 x 4.7cm. No fibroids are present. Endometrial stripe thickness is 0.6 cm. Remaining findings as above in CT scan.     8/15/15: CEA (1.8), CA-125 (202), LDH (2340), hCG (51), AFP (7,592)     8/14/15: CT abdomen and pelvis IMPRESSION:    1. Very large right abdomen and pelvis and smaller left adnexal region mass with retroperitoneal adenopathy and small ascites. Findings are compatible with malignancy, likely bilateral ovarian tumor, greater on  the right. [Critical Result: Large mid to right abdominal mass and smaller 6 cm left adnexal mass concerning for ovarian malignancy with small free fluid and periaortic adenopathy concerning for metastasis.]     8/24/15:  exploratory laparotomy, tumor debulking, left salpingo-oophorectomy, complete omentectomy, left pelvic and para aortic lymph node dissection, and bladder peritoneum stripping  Cytology:    CYTOLOGIC INTERPRETATION:  A. Pelvic Washings: Positive for Malignancy  - Consistent with germ cell tumor (dysgerminoma)Specimen Adequacy: Satisfactory for evaluation.  B. Diaphragm fluid, Left Washings: Negative for malignancy Specimen Adequacy: Satisfactory for evaluation.  C. Diaphragm fluid, Right Washings: Negative for malignancy Specimen Adequacy: Satisfactory for evaluation.  Pathology:  SPECIMEN(S):  A: Bladder peritoneum  B: Left ovary and fallopian tube  C: Left fallopian tube  D: Left pelvic peritoneum  E: Omentum  F: Omental nodule  G: Lymph nodes, left periaortic  H: Lymph node, left infrarenal  I: Lymph nodes, left pelvic  J: Right bladder peritoneum nodule  K: Cul-de-sac biopsy, posterior  FINAL DIAGNOSIS:  A. Peritoneum, bladder, excision:  -Metastatic malignant germ cell tumor (fragmented, 13.1 cm in aggregate)  B. 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 Fallopian tube, left, salpingectomy:  -Smooth muscle and mesothelium  -No fallopian tube identified  -Uninvolved by tumor  D. Peritoneum, left pelvic, biopsy:  -Metastatic malignant germ cell tumor  E. Omentum, omentectomy:  -Metastatic malignant germ cell tumor, size 2 mm  F. Omentum, nodule, excision:  -Metastatic malignant germ cell tumor  -Fragmented specimen (largest intact piece of tumor: 1 cm)  G. Lymph nodes, left periaortic, dissection:  -Metastatic malignant germ cell tumor in four of ten lymph nodes (4/10)  -Largest metastasis is 12 mm in greatest dimension  -Extranodal extension present  -Malignant germ cell tumor also present as detached tumor  fragments, without identifiable lymph node  H. Lymph nodes, left infrarenal, dissection:  -Metastatic malignant germ cell tumor in two of five lymph nodes (2/5)  -Larger metastasis is 7 mm in greatest dimension  -Extranodal extension present  I. Lymph nodes, left pelvic, dissection:  -Metastatic malignant germ cell tumor in one of six lymph nodes (1/6)  -Metastasis is 4.5 mm in greatest dimension  J. Peritoneum, right bladder, biopsy:  -Metastatic malignant germ cell tumor (size 11 mm)  K. Peritoneum, cul de sac, biopsy:  -Metastatic malignant germ cell tumor  COMMENT:  The ovarian tumor shows a mixture of dysgerminoma (70%) and yolk sac tumor (30%). Similarly, the omental, peritoneal and lymph node metastases show both components, although yolk sac tumor composes the majority of the metastatic tumor volume. Neither embryonal carcinoma nor choriocarcinoma is identified.  9/14/15-11/23/15 Cycle #1-4 BEP         CT c/a/p IMPRESSION:    1. Postsurgical changes status post removal of the mixed germ cell tumor and left salpingo-oophorectomy with lymph node dissection as above.  2. Unchanged left apical 4 mm groundglass pleural-based nodule. No definite evidence of recurrence or metastasis in chest, abdomen, or pelvis     3/17/16: Surveillance visit. Pap NIL. LDH not done, AFP  2.9,   8, and beta HCG  <3.  6/6/16: Chest port removed.  6/24/16: , AFP 2.8,   10,   Beta HCG <3  9/23/16: , AFP  2.4,   10,  Beta HCG <3  12/21/16: LDH  236, AFP  2.1,   18, Beta HCG <3. Pap NIL.   3/10/17: LDH  235, AFP  3.7,   12, Beta HCG <3  6/2/17: LDH, AFP, , Beta HCG pending      Today she comes to clinic feeling well and denies any new or concerning issues. She is currently having her menses. She is sexually active, denies any issues, is not using a condom, and has an IUD in place. She denies any vaginal bleeding, no changes in her bowel or bladder habits, no nausea/emesis, no lower  extremity edema, and no difficulties eating or sleeping. She denies any abdominal discomfort/bloating, no fevers or chills, and no chest pain or shortness of breath. The ringing in her ears is the same and not new. She is due to see her PCP. She reports her left breast concern is still present since her last visit; she has changed her bra type since that time.         Review of Systems:    Systemic           no weight changes; no fever; no chills; no night sweats; no appetite changes  Skin           no rashes, or lesions  Eye           no irritation; no changes in vision  Christina-Laryngeal           no dysphagia; no hoarseness   Pulmonary    no cough; no shortness of breath  Cardiovascular    no chest pain; no palpitations  Gastrointestinal    no diarrhea; no constipation; no abdominal pain; no changes in bowel habits; no blood in stool  Genitourinary   no urinary frequency; no urinary urgency; no dysuria; no pain; no abnormal vaginal discharge; no abnormal vaginal bleeding  Breast    no breast discharge; no breast changes; no breast pain  Musculoskeletal    no myalgias; no arthralgias; no back pain  Psychiatric           no depressed mood; no anxiety    Hematologic               no tender lymph nodes; no noticeable swellings or lumps   Endocrine    no hot flashes; no heat/cold intolerance         Neurological   no tremor; no numbness and tingling; no headaches; no difficulty sleeping      Past Medical History:    Past Medical History:   Diagnosis Date     Contact dermatitis and other eczema, due to unspecified cause      Other  infants, unspecified (weight)(765.10)     born about 35 week - in NICU for 2 weeks - breathing issues.      Pelvic mass          Past Surgical History:    Past Surgical History:   Procedure Laterality Date     LAPAROTOMY, TUMOR DEBULKING, COMBINED N/A 2015    Procedure: COMBINED LAPAROTOMY, TUMOR DEBULKING;  Surgeon: Vita Jordan MD;  Location: [facility] OR     OMENTECTOMY N/A 2015  "   Procedure: OMENTECTOMY;  Surgeon: Vtia Jordan MD;  Location: [facility] OR     SALPINGO-OOPHORECTOMY, DISSECT LYMPH NODE Left 8/24/2015    Procedure: SALPINGO-OOPHORECTOMY, DISSECT LYMPH NODE;  Surgeon: Vita Jordan MD;  Location: [facility] OR     SURGICAL HISTORY OF -       no surgeries         Health Maintenance Due   Topic Date Due     CHLAMYDIA SCREENING  1993     HPV IMMUNIZATION (1 of 3 - Female 3 Dose Series) 09/16/2004     TETANUS IMMUNIZATION (SYSTEM ASSIGNED)  07/28/2016       Current Medications:     Current Outpatient Prescriptions   Medication Sig Dispense Refill     UNABLE TO FIND Apply topically as needed MEDICATION NAME: quadriderm cream           Allergies:        Allergies   Allergen Reactions     Cats         Social History:     Social History   Substance Use Topics     Smoking status: Never Smoker     Smokeless tobacco: Never Used     Alcohol use 0.0 oz/week     0 Standard drinks or equivalent per week      Comment: occasional       History   Drug Use No         Family History:       Family History   Problem Relation Age of Onset     C.A.D. No family hx of      Hypertension No family hx of      Asthma No family hx of      Breast Cancer No family hx of      Cancer - colorectal No family hx of      Blood Disease No family hx of      no DVT/PE      Anesthesia Reaction No family hx of      DIABETES Father          Physical Exam:     /75  Pulse 73  Temp 97.9  F (36.6  C) (Oral)  Resp 18  Ht 1.575 m (5' 2.01\")  Wt 115.1 kg (253 lb 12.8 oz)  LMP 05/31/2017 (Exact Date)  SpO2 96%  BMI 46.41 kg/m2  Body mass index is 46.41 kg/(m^2).    General Appearance: healthy and alert, no distress     HEENT: no thyromegaly, no palpable nodules or masses        Cardiovascular: regular rate and rhythm, no gallops, rubs or murmurs     Respiratory: lungs clear, no rales, rhonchi or wheezes, normal diaphragmatic excursion    Musculoskeletal: extremities non tender and without edema    Skin: no lesions or " rashes     Breast:    Pendulous breasts R>L; left breast with some irregular contouring most notable in forward leaning, some dryness along lateral bra line    Neurological: normal gait, no gross defects     Psychiatric: appropriate mood and affect                               Hematological: normal cervical, supraclavicular and inguinal lymph nodes     Gastrointestinal:       abdomen soft, non-tender, non-distended, no organomegaly or masses    Genitourinary: External genitalia and urethral meatus appears normal.  Vagina is smooth without nodularity or masses; menses..  Cervix appears normal and without lesions; IUD.  Bimanual exam reveal no masses, nodularity or fullness.        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 She completed four cycles of BEP 12/2015. She presents today for a surveillance visit.    20 minutes were spent with this patient, over 50% of that time was spent in symptom management, treatment planning and in counseling and coordination of care.      Plan:     1.)        Patient to RTC in 3 months for her next surveillance visit and labs. Today's labs are pending; discussed her labs may be elevated due to having her menses. Per [facility], imaging is not indicated unless clinically relevant. Reviewed signs and symptoms for when she should contact the clinic or seek additional care. Patient to contact the clinic with any questions or concerns in the interim.     2.) Genetic risk factors were assessed and the patient does not meet the qualifications for a referral.      3.) Labs and/or tests ordered include:  LDH, AFP, , Beta HCG.      4.) Health maintenance issues addressed today include annual health maintenance and non-gynecologic issues with PCP. Encouraged to speak with PCP about getting a mammogram for further evaluation; she is declining having a mammogram here.     WEN Ontiveros, WHNP-BC, ANP-BC  Women's Health Nurse  Practitioner  Adult Nurse Pracitioner  Gynecologic Oncology      CC  Patient Care Team:  Kasey Toure MD as PCP - General (Family Practice)    Your AFP and  are stable/normal. Your LDH is slightly higher than previous so I would like you to have this redrawn in a month. The order is already in so you can call to schedule this lab appointment. Contact the clinic or send a [employer] message if you have any questions. Your beta Hcg is normal (negative).    WEN Coello CNP

## 2017-06-02 NOTE — NURSING NOTE
Chief Complaint   Patient presents with     Oncology Clinic Visit     Encounter for follow-up surveillance of ovarian cancer     Blood Draw     Pt with hx of ovarian ca labs collected via venipuncture with u/s.

## 2017-06-02 NOTE — NURSING NOTE
"Oncology Rooming Note    June 2, 2017 12:55 PM   Dianna Vasquez is a 21 year old female who presents for:    Chief Complaint   Patient presents with     Oncology Clinic Visit     Encounter for follow-up surveillance of ovarian cancer     Blood Draw     Pt with hx of ovarian ca labs collected via venipuncture with u/s.     Initial Vitals: /75  Pulse 73  Temp 97.9  F (36.6  C) (Oral)  Resp 18  Ht 1.575 m (5' 2.01\")  Wt 115.1 kg (253 lb 12.8 oz)  LMP 05/31/2017 (Exact Date)  SpO2 96%  BMI 46.41 kg/m2 Estimated body mass index is 46.41 kg/(m^2) as calculated from the following:    Height as of this encounter: 1.575 m (5' 2.01\").    Weight as of this encounter: 115.1 kg (253 lb 12.8 oz). Body surface area is 2.24 meters squared.  No Pain (0) Comment: Data Unavailable   Patient's last menstrual period was 05/31/2017 (exact date).  Allergies reviewed: Yes  Medications reviewed: Yes    Medications: Medication refills not needed today.  Pharmacy name entered into [facility]:    [id number] - [place], [place] - [address] [facility] [address] [facility] OF [address] [place] [address] [facility] [place] - [place], [place] - [address] [facility] - [place], [place] - [address] [facility] - MAIL ORDER ONLY - [place], [place]    Clinical concerns: None Ayla Liz was NOT notified.    7 minutes for nursing intake (face to face time)     Fatimah Lopez LPN              "

## 2017-06-05 LAB — HCG-TM SERPL-ACNC: NORMAL IU/L

## 2017-06-05 NOTE — PROGRESS NOTES
Your AFP and  are stable/normal. Your LDH is slightly higher than previous so I would like you to have this redrawn in a month. The order is already in so you can call to schedule this lab appointment. Contact the clinic or send a [employer] message if you have any questions.

## 2017-06-24 DIAGNOSIS — C56.2 OVARIAN CANCER, LEFT (H): ICD-10-CM

## 2017-06-24 DIAGNOSIS — Z08 ENCOUNTER FOR FOLLOW-UP SURVEILLANCE OF OVARIAN CANCER: ICD-10-CM

## 2017-06-24 DIAGNOSIS — Z85.43 ENCOUNTER FOR FOLLOW-UP SURVEILLANCE OF OVARIAN CANCER: ICD-10-CM

## 2017-06-24 DIAGNOSIS — C80.1 MIXED GERM CELL TUMOR (H): ICD-10-CM

## 2017-06-24 LAB
AFP SERPL-MCNC: 2.3 UG/L (ref 0–8)
CANCER AG125 SERPL-ACNC: 12 U/ML (ref 0–30)
LDH SERPL L TO P-CCNC: 280 U/L (ref 81–234)

## 2017-06-24 PROCEDURE — 83615 LACTATE (LD) (LDH) ENZYME: CPT | Performed by: NURSE PRACTITIONER

## 2017-06-24 PROCEDURE — 36415 COLL VENOUS BLD VENIPUNCTURE: CPT | Performed by: NURSE PRACTITIONER

## 2017-06-24 PROCEDURE — 86304 IMMUNOASSAY TUMOR CA 125: CPT | Performed by: NURSE PRACTITIONER

## 2017-06-24 PROCEDURE — 82105 ALPHA-FETOPROTEIN SERUM: CPT | Performed by: NURSE PRACTITIONER

## 2017-06-24 PROCEDURE — 84702 CHORIONIC GONADOTROPIN TEST: CPT | Performed by: NURSE PRACTITIONER

## 2017-06-26 LAB — HCG-TM SERPL-ACNC: NORMAL IU/L

## 2017-06-26 NOTE — PROGRESS NOTES
Your  is normal at 12 and your AFP is normal at 2.3. Your LDH is slightly elevated but lower than previous. You can have this rechecked at your next visit.

## 2017-09-07 NOTE — PROGRESS NOTES
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 She completed fourth cycle of BEP 12/2015. She presents today for a three month surveillance visit.    HPI: Dianna comes to the clinic feeling well without gynecologic concern. She reports light periods but this has been normal for her since having an IUD placed. She is sexually active with her boyfriend, denies dyspareunia or post-coital bleeding. She is back in school this fall taking art classes and a Norse history class, working at [employer]. Denies unintended weight loss, fatigue, weakness, changes in vision or hearing, shortness of breath, cough, chest pain, abdominal pain, dyspepsia, nausea, vomiting, constipation, diarrhea, bloating, dysuria, urinary frequency or urgency, hematuria, pelvic pain, lower back pain, vaginal discharge,numbness or tingling, or swelling of the extremities.     Brief Oncology History:  GYN History:  7/30/15: She was seen in the ED due to pelvic pain. History of irregular vaginal bleeding with positive pregnancy test. Further work up with US remarkable for 20cm pelvic mass    8/12/2015 CT scan:  Pelvic mass approximately 21 cm transverse, 14 cm AP and 20 cm craniocaudal dimension. This has cystic or necrotic areas within it with thick heterogeneous soft tissue rim.This is felt to be a large tumor, probably right ovarian origin. There is an approximately 6 x 5 x 6 cm left adnexal heterogeneous mass consistent with a smaller left ovarian mass suspicious for tumor as well. Retroperitoneal adenopathy. Small amount of free fluid in the  pelvis. No pelvic adenopathy    8/12/2015 Pelvic US  The uterus measures 6.6 x 4.2 x 4.7cm. No fibroids are present. Endometrial stripe thickness is 0.6 cm. Remaining findings as above in CT scan.    8/15/15: CEA (1.8), CA-125 ( 202), LDH (2340), hCG (51), AFP (7,592)    8/14/15: CT abdomen and pelvis   IMPRESSION:   1. Very large  right abdomen and pelvis and smaller left adnexal region mass with retroperitoneal adenopathy and small ascites. Findings are compatible with malignancy, likely bilateral ovarian tumor, greater on  the right. [Critical Result: Large mid to right abdominal mass and smaller 6 cm left adnexal mass concerning for ovarian malignancy with small free fluid and periaortic adenopathy concerning for metastasis.]    8/24/15: exploratory laparotomy, tumor debulking, left salpingo-oophorectomy, complete omentectomy, left pelvic and para aortic lymph node dissection, and bladder peritoneum stripping  Cytology:   CYTOLOGIC INTERPRETATION:  A. Pelvic Washings: Positive for Malignancy  - Consistent with germ cell tumor (dysgerminoma)Specimen Adequacy: Satisfactory for evaluation.  B. Diaphragm fluid, Left Washings: Negative for malignancy Specimen Adequacy: Satisfactory for evaluation.  C. Diaphragm fluid, Right Washings: Negative for malignancy Specimen Adequacy: Satisfactory for evaluation.  Pathology:  SPECIMEN(S):  A: Bladder peritoneum  B: Left ovary and fallopian tube  C: Left fallopian tube  D: Left pelvic peritoneum  E: Omentum  F: Omental nodule  G: Lymph nodes, left periaortic  H: Lymph node, left infrarenal  I: Lymph nodes, left pelvic  J: Right bladder peritoneum nodule  K: Cul-de-sac biopsy, posterior  FINAL DIAGNOSIS:  A. Peritoneum, bladder, excision:  -Metastatic malignant germ cell tumor (fragmented, 13.1 cm in aggregate)  B. 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 Fallopian tube, left, salpingectomy:  -Smooth muscle and mesothelium  -No fallopian tube identified  -Uninvolved by tumor  D. Peritoneum, left pelvic, biopsy:  -Metastatic malignant germ cell tumor  E. Omentum, omentectomy:  -Metastatic malignant  germ cell tumor, size 2 mm  F. Omentum, nodule, excision:  -Metastatic malignant germ cell tumor  -Fragmented specimen (largest intact piece of tumor: 1 cm)  G. Lymph nodes, left periaortic, dissection:  -Metastatic malignant germ cell tumor in four of ten lymph nodes (4/10)  -Largest metastasis is 12 mm in greatest dimension  -Extranodal extension present  -Malignant germ cell tumor also present as detached tumor fragments, without identifiable lymph node  H. Lymph nodes, left infrarenal, dissection:  -Metastatic malignant germ cell tumor in two of five lymph nodes (2/5)  -Larger metastasis is 7 mm in greatest dimension  -Extranodal extension present  I. Lymph nodes, left pelvic, dissection:  -Metastatic malignant germ cell tumor in one of six lymph nodes (1/6)  -Metastasis is 4.5 mm in greatest dimension  J. Peritoneum, right bladder, biopsy:  -Metastatic malignant germ cell tumor (size 11 mm)  K. Peritoneum, cul de sac, biopsy:  -Metastatic malignant germ cell tumor  COMMENT:  The ovarian tumor shows a mixture of dysgerminoma (70%) and yolk sac tumor (30%). Similarly, the omental, peritoneal and lymph node metastases show both components, although yolk sac tumor composes the majority of the metastatic tumor volume. Neither embryonal carcinoma nor choriocarcinoma is identified.        Results for FREDERICK LEWIS (MRN [id number]) as of 11/13/2015 12:22   Ref. Range  8/15/2015 08:35  9/8/2015 13:25  9/14/2015 08:08  10/5/2015 08:20  10/26/2015  11/16/2015  12/14/2015      Latest Range: 0-30 U/mL  202 (H)  62 (H)  53 (H)  14  12  14  11      Results for FREDERICK LEWIS (MRN [id number]) as of 11/13/2015 12:22   Ref. Range  8/15/2015 08:35  9/8/2015 13:25  9/14/2015 08:08  10/5/2015 08:20  10/26/15  11/16/15  [id number]    Alpha Fetoprotein  Latest Range: 0-8 ug/L  7592.7 (H)  254.5 (H)  241.1 (H)  75.0 (H)  6.1  3.3  2.8        9/14/15 Cycle 1 BEP   9/29/15 Cycle 2 BEP  10/5/15: C2D5 Etoposide cisplatin, bleomycin    10/19/15: C2D15 bleomycin  10/21/15: C2D17 BEP, doing well  11/13/15: C3D17 BEP  11/23/15: C4D1 BEP   11/30/15: C4 D15 BEP  CT c/a/p  IMPRESSION:   1. Postsurgical changes status post removal of the mixed germ cell  tumor and left salpingo-oophorectomy with lymph node dissection as  above.  2. Unchanged left apical 4 mm groundglass pleural-based nodule. No  definite evidence of recurrence or metastasis in chest, abdomen, or pelvis    3/17/16: Surveillance visit. Pap NIL. , AFP 2.9,  8, and beta HCG <3  16: AFP 2.8, beta HCG <3,  10,   16: AFP 2.4, beta HCG <3,  10,   16: AFP 2.1, beta HCG <3,  18; , Pap NIL, HPV negative  3/10/17: , AFP 3.7, beta HCG <3,  12  17: ,  11, beta HCG <3, AFP <1.5  17: ,  pending, beta HCG pending, AFP pending    Past Medical History:   Diagnosis Date     Contact dermatitis and other eczema, due to unspecified cause      Other  infants, unspecified (weight)(765.10)     born about 35 week - in NICU for 2 weeks - breathing issues.      Pelvic mass        Past Surgical History:   Procedure Laterality Date     LAPAROTOMY, TUMOR DEBULKING, COMBINED N/A 2015    Procedure: COMBINED LAPAROTOMY, TUMOR DEBULKING;  Surgeon: Vita Jordan MD;  Location: [facility] OR     OMENTECTOMY N/A 2015    Procedure: OMENTECTOMY;  Surgeon: Vita Jordan MD;  Location: [facility] OR     SALPINGO-OOPHORECTOMY, DISSECT LYMPH NODE Left 2015    Procedure: SALPINGO-OOPHORECTOMY, DISSECT LYMPH NODE;  Surgeon: Vita Jordan MD;  Location: [facility] OR     SURGICAL HISTORY OF -       no surgeries       Social History     Social History     Marital status: Single     Spouse name: N/A     Number of children: N/A     Years of education: N/A     Occupational History     Not on file.     Social History Main Topics     Smoking status: Never Smoker     Smokeless tobacco: Never Used     Alcohol use 0.0  oz/week     0 Standard drinks or equivalent per week      Comment: occasional     Drug use: No     Sexual activity: Yes     Partners: Male     Other Topics Concern     Not on file     Social History Narrative       Family History   Problem Relation Age of Onset     C.A.D. No family hx of      Hypertension No family hx of      Asthma No family hx of      Breast Cancer No family hx of      Cancer - colorectal No family hx of      Blood Disease No family hx of      no DVT/PE      Anesthesia Reaction No family hx of      DIABETES Father        Current Outpatient Prescriptions   Medication     UNABLE TO FIND     No current facility-administered medications for this visit.           Allergies   Allergen Reactions     Cats          ROS  General: Denies fatigue, changes in weight, weakness, appetite changes, night sweats, hot flashes, fever, chills, or difficulty sleeping  HEENT: Denies headaches, tinnitus, hair loss, visual difficulty or disturbances, spots or floaters, diplopia, masses, head injury, tinnitus, hearing loss, epistaxis, congestion, problems with teeth or gums, dysphonia, or dysphagia  Breast: See HPI.  Pulmonary: Denies cough, sputum, hemoptysis, shortness of breath, dyspnea on exertion, wheezing, or allergies  Cardiovascular: Denies chest pain, fainting, palpitations, murmurs, activity intolerance, swelling in legs, or high blood pressure  Gastrointestinal: Denies nausea, vomiting, constipation, diarrhea, abdominal pain, bloating, heartburn, melena, hematochezia, or jaundice  Genitourinary: Denies dysuria, urinary urgency or frequency, hematuria, cloudy or malodorous urine, incontinence, repeat urinary tract infections, flank pain, pelvic pain, irregular vaginal bleeding, vaginal discharge, or vaginal dryness  Sexual Function: Denies pain with intercourse, changes in libido, arousal difficulty, or changes in orgasm  Integumentary: Denies rashes, sores, changing moles, or scarring  Hematologic: Denies  "swollen lymph nodes, masses, easy bruising, or easy bleeding  Musculoskeletal: Denies falls, back pain, myalgias, arthralgias, stiffness, muscle weakness or muscle cramps  Neurologic: Denies numbness or tingling, changes in memory, difficulty with walking, dizziness, seizures, or tremors  Psychiatric: Denies anxiety, depression, nervousness, mood changes, suicidal thoughts, or difficulty concentrating  Endocrine: Denies polydipsia, polyuria, temperature intolerance, or history of thyroid disease      OBJECTIVE:    Physical Exam:    /79  Pulse 103  Temp 98.3  F (36.8  C) (Oral)  Resp 16  Ht 1.575 m (5' 2.01\")  Wt 120.7 kg (266 lb)  LMP 08/01/2017 (Approximate)  SpO2 97%  BMI 48.64 kg/m2    General: Alert non-toxic appearing female in no acute distress  HEENT: Normocephalic, atraumatic; PERRLA; no scleral icterus; oropharynx pink without lesions; neck supple without lymphadenopathy  Pulmonary: Lungs clear to auscultation, no increased work of breathing noted  CV/PV: Regular rate and rhythm, S1S2, no clicks, murmurs, rubs, or gallops; bilateral lower extremities without edema  GI: Normoactive bowel sounds x4 quadrants, abdomen obese, soft, non-distended, and non-tender to palpation without masses or organomegaly  : External genitalia and urethral meatus pink without lesions, masses, or excoriation. Vagina pink and moist without masses or lesions. Cervix without masses or polyps, os patent with clear discharge and visible IUD strings. Bimanual exam reveals no masses, fullness, or cervical motion tenderness. Rectovaginal exam confirms these findings.  Heme: Cervical, supraclavicular, and inguinal lymph nodes without lymphadenopathy  Neuro: Grossly intact, normal gait  Skin: Appropriate color for race, warm and dry, no rashes or lesions to unclothed skin  Psych: Pleasant and interactive, affect bright, makes appropriate eye contact, thought process linear    Labs:    AFP pending   " pending  Beta HCG tumor marker pending      Assessment/Plan:  1) Stage IIIC mixed germ cell tumor, left ovary: No signs of recurrence on physical exam or history, tumor markers pending. Continue surveillance every three months x2 years (12/2017) followed by yearly surveillance visits through 12/2020 per NCCN guidelines. Reviewed signs and symptoms  of recurrence including but not limited to bleeding from vagina, bladder, or rectum, bloating, early satiety, swelling in the lower extremities, abdominal or lower back pain, changes in bowel or bladder patterns, shortness of breath, increased fatigue, unexplained weight loss, and night sweats. Reviewed signs and symptoms for when she should contact the clinic or seek additional care. Patient to contact the clinic with any questions or concerns in the interim.  2) Genetic testing: Does not meet criteria for testing  3) Labs: LDH, AFP, , beta HCG tumor marker  4) Health maintenance issues discussed today include to follow up with PCP for co-morbid conditions and non-gynecologic issues.  5) Patient verbalized understanding of and agreement with plan.    20 minutes face to face time spent with patient, over 50% of which was spent in counseling and coordination of care.    WEN Reagan, FNP-C  Family Nurse Practitioner  Gynecologic Oncology  [facility]  Pager: [phone]

## 2017-09-08 ENCOUNTER — APPOINTMENT (OUTPATIENT)
Dept: LAB | Facility: CLINIC | Age: 22
End: 2017-09-08
Attending: PHYSICIAN ASSISTANT
Payer: COMMERCIAL

## 2017-09-08 ENCOUNTER — ONCOLOGY VISIT (OUTPATIENT)
Dept: ONCOLOGY | Facility: CLINIC | Age: 22
End: 2017-09-08
Attending: NURSE PRACTITIONER
Payer: COMMERCIAL

## 2017-09-08 VITALS
TEMPERATURE: 98.3 F | DIASTOLIC BLOOD PRESSURE: 79 MMHG | WEIGHT: 266 LBS | HEART RATE: 103 BPM | OXYGEN SATURATION: 97 % | BODY MASS INDEX: 48.95 KG/M2 | SYSTOLIC BLOOD PRESSURE: 127 MMHG | HEIGHT: 62 IN | RESPIRATION RATE: 16 BRPM

## 2017-09-08 DIAGNOSIS — Z85.43 ENCOUNTER FOR FOLLOW-UP SURVEILLANCE OF OVARIAN CANCER: Primary | ICD-10-CM

## 2017-09-08 DIAGNOSIS — Z08 ENCOUNTER FOR FOLLOW-UP SURVEILLANCE OF OVARIAN CANCER: Primary | ICD-10-CM

## 2017-09-08 DIAGNOSIS — C80.1 MIXED GERM CELL TUMOR (H): ICD-10-CM

## 2017-09-08 DIAGNOSIS — C56.2 OVARIAN CANCER, LEFT (H): ICD-10-CM

## 2017-09-08 LAB
AFP SERPL-MCNC: 1.5 UG/L (ref 0–8)
CANCER AG125 SERPL-ACNC: 11 U/ML (ref 0–30)
LDH SERPL L TO P-CCNC: 263 U/L (ref 81–234)

## 2017-09-08 PROCEDURE — 99212 OFFICE O/P EST SF 10 MIN: CPT | Mod: ZF

## 2017-09-08 PROCEDURE — 82105 ALPHA-FETOPROTEIN SERUM: CPT | Performed by: NURSE PRACTITIONER

## 2017-09-08 PROCEDURE — 83615 LACTATE (LD) (LDH) ENZYME: CPT | Performed by: NURSE PRACTITIONER

## 2017-09-08 PROCEDURE — 86304 IMMUNOASSAY TUMOR CA 125: CPT | Performed by: NURSE PRACTITIONER

## 2017-09-08 PROCEDURE — 84702 CHORIONIC GONADOTROPIN TEST: CPT | Performed by: NURSE PRACTITIONER

## 2017-09-08 PROCEDURE — 36415 COLL VENOUS BLD VENIPUNCTURE: CPT

## 2017-09-08 PROCEDURE — 99213 OFFICE O/P EST LOW 20 MIN: CPT | Mod: ZP | Performed by: NURSE PRACTITIONER

## 2017-09-08 ASSESSMENT — PAIN SCALES - GENERAL: PAINLEVEL: NO PAIN (0)

## 2017-09-08 NOTE — MR AVS SNAPSHOT
After Visit Summary   9/8/2017    Dianna Vasquez    MRN: [id number]           Patient Information     Date Of Birth          1995        Visit Information        Provider Department      9/8/2017 1:20 PM Rosita Waggoner APRN CNP [facility]        Today's Diagnoses     Encounter for follow-up surveillance of ovarian cancer    -  1    Ovarian cancer, left (H)        Mixed germ cell tumor           Follow-ups after your visit        Follow-up notes from your care team     Return in about 3 months (around 12/8/2017), or if symptoms worsen or fail to improve, for Surveillance with Rosita.      Your next 10 appointments already scheduled     Dec 29, 2017 12:30 PM CST   [facility]  [facility]   [facility] Draw ([facility])    [address] [place] [id number] [phone]            Dec 29, 2017  1:20 PM CST   (Arrive by 1:05 PM)   Return Visit with WEN Reagan CNP   [facility] ([facility])    [address] [place] [id number] [phone]              Who to contact     If you have questions or need follow up information about today's clinic visit or your schedule please contact [facility] directly at [phone].  Normal or non-critical lab and imaging results will be communicated to you by MyChart, letter or phone within 4 business days after the clinic has received the results. If you do not hear from us within 7 days, please contact the clinic through MyChart or phone. If you have a critical or abnormal lab result, we will notify you by phone as soon as possible.  Submit refill requests through [employer] or call your pharmacy and they will forward the refill request to us. Please allow 3 business days for your refill to be completed.          Additional Information About Your Visit        MyChart  "Information     [facility] gives you secure access to your electronic health record. If you see a primary care provider, you can also send messages to your care team and make appointments. If you have questions, please call your primary care clinic.  If you do not have a primary care provider, please call [phone] and they will assist you.        Care EveryWhere ID     This is your Care EveryWhere ID. This could be used by other organizations to access your [facility] medical records  [id number]        Your Vitals Were     Pulse Temperature Respirations Height Last Period Pulse Oximetry    103 98.3  F (36.8  C) (Oral) 16 1.575 m (5' 2.01\") 08/01/2017 (Approximate) 97%    BMI (Body Mass Index)                   48.64 kg/m2            Blood Pressure from Last 3 Encounters:   09/08/17 127/79   06/02/17 110/75   03/10/17 120/75    Weight from Last 3 Encounters:   09/08/17 120.7 kg (266 lb)   06/02/17 115.1 kg (253 lb 12.8 oz)   03/10/17 111.9 kg (246 lb 11.2 oz)              We Performed the Following     AFP tumor marker     B-hcg Tumor Marker          Lactate Dehydrogenase        Primary Care Provider Office Phone # Fax #    Kasey Nona Toure [phone] [address] [facility] [address]        Equal Access to Services     SHA HERNADEZ : Hadii machelle mejiao Soleidy, waaxda luqadaha, qaybta kaalfederico wharton, david britton. So [facility] [phone].    ATENCIÓN: Si habla español, tiene a mahoney disposición servicios gratuitos de asistencia lingüística. Llame al [phone].    We comply with applicable federal civil rights laws and [place] laws. We do not discriminate on the basis of race, color, national origin, age, disability sex, sexual orientation or gender identity.            Thank you!     Thank you for choosing [facility]  for your care. Our goal is always to provide you with excellent care. Hearing back from our patients is one " way we can continue to improve our services. Please take a few minutes to complete the written survey that you may receive in the mail after your visit with us. Thank you!             Your Updated Medication List - Protect others around you: Learn how to safely use, store and throw away your medicines at www.disposemymeds.org.          This list is accurate as of: 9/8/17  4:59 PM.  Always use your most recent med list.                   Brand Name Dispense Instructions for use Diagnosis    UNABLE TO FIND      Apply topically as needed MEDICATION NAME: quadriderm cream    Pelvic mass, Abdominal pain, generalized, Elevated CA-125

## 2017-09-08 NOTE — LETTER
9/8/2017       RE: Dianna Vasquez  [address]  [place] [address]     Dear Colleague,    Thank you for referring your patient, Dianna Vasquez, to the [facility]. Please see a copy of my visit note below.    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 She completed fourth cycle of BEP 12/2015. She presents today for a three month surveillance visit.    HPI: Dianna comes to the clinic feeling well without gynecologic concern. She reports light periods but this has been normal for her since having an IUD placed. She is sexually active with her boyfriend, denies dyspareunia or post-coital bleeding. She is back in school this fall taking art classes and a [employer] history class, working at [employer]. Denies unintended weight loss, fatigue, weakness, changes in vision or hearing, shortness of breath, cough, chest pain, abdominal pain, dyspepsia, nausea, vomiting, constipation, diarrhea, bloating, dysuria, urinary frequency or urgency, hematuria, pelvic pain, lower back pain, vaginal discharge,numbness or tingling, or swelling of the extremities.     Brief Oncology History:  GYN History:  7/30/15: She was seen in the ED due to pelvic pain. History of irregular vaginal bleeding with positive pregnancy test. Further work up with US remarkable for 20cm pelvic mass    8/12/2015 CT scan:  Pelvic mass approximately 21 cm transverse, 14 cm AP and 20 cm craniocaudal dimension. This has cystic or necrotic areas within it with thick heterogeneous soft tissue rim.This is felt to be a large tumor, probably right ovarian origin. There is an approximately 6 x 5 x 6 cm left adnexal heterogeneous mass consistent with a smaller left ovarian mass suspicious for tumor as well. Retroperitoneal adenopathy. Small amount of free fluid in the  pelvis. No pelvic adenopathy    8/12/2015 Pelvic US  The uterus measures 6.6 x 4.2 x 4.7cm. No fibroids  are present. Endometrial stripe thickness is 0.6 cm. Remaining findings as above in CT scan.    8/15/15: CEA (1.8), CA-125 ( 202), LDH (2340), hCG (51), AFP (7,592)    8/14/15: CT abdomen and pelvis   IMPRESSION:   1. Very large right abdomen and pelvis and smaller left adnexal region mass with retroperitoneal adenopathy and small ascites. Findings are compatible with malignancy, likely bilateral ovarian tumor, greater on  the right. [Critical Result: Large mid to right abdominal mass and smaller 6 cm left adnexal mass concerning for ovarian malignancy with small free fluid and periaortic adenopathy concerning for metastasis.]    8/24/15: exploratory laparotomy, tumor debulking, left salpingo-oophorectomy, complete omentectomy, left pelvic and para aortic lymph node dissection, and bladder peritoneum stripping  Cytology:   CYTOLOGIC INTERPRETATION:  A. Pelvic Washings: Positive for Malignancy  - Consistent with germ cell tumor (dysgerminoma)Specimen Adequacy: Satisfactory for evaluation.  B. Diaphragm fluid, Left Washings: Negative for malignancy Specimen Adequacy: Satisfactory for evaluation.  C. Diaphragm fluid, Right Washings: Negative for malignancy Specimen Adequacy: Satisfactory for evaluation.  Pathology:  SPECIMEN(S):  A: Bladder peritoneum  B: Left ovary and fallopian tube  C: Left fallopian tube  D: Left pelvic peritoneum  E: Omentum  F: Omental nodule  G: Lymph nodes, left periaortic  H: Lymph node, left infrarenal  I: Lymph nodes, left pelvic  J: Right bladder peritoneum nodule  K: Cul-de-sac biopsy, posterior  FINAL DIAGNOSIS:  A. Peritoneum, bladder, excision:  -Metastatic malignant germ cell tumor (fragmented, 13.1 cm in aggregate)  B. 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 Fallopian tube,  left, salpingectomy:  -Smooth muscle and mesothelium  -No fallopian tube identified  -Uninvolved by tumor  D. Peritoneum, left pelvic, biopsy:  -Metastatic malignant germ cell tumor  E. Omentum, omentectomy:  -Metastatic malignant germ cell tumor, size 2 mm  F. Omentum, nodule, excision:  -Metastatic malignant germ cell tumor  -Fragmented specimen (largest intact piece of tumor: 1 cm)  G. Lymph nodes, left periaortic, dissection:  -Metastatic malignant germ cell tumor in four of ten lymph nodes (4/10)  -Largest metastasis is 12 mm in greatest dimension  -Extranodal extension present  -Malignant germ cell tumor also present as detached tumor fragments, without identifiable lymph node  H. Lymph nodes, left infrarenal, dissection:  -Metastatic malignant germ cell tumor in two of five lymph nodes (2/5)  -Larger metastasis is 7 mm in greatest dimension  -Extranodal extension present  I. Lymph nodes, left pelvic, dissection:  -Metastatic malignant germ cell tumor in one of six lymph nodes (1/6)  -Metastasis is 4.5 mm in greatest dimension  J. Peritoneum, right bladder, biopsy:  -Metastatic malignant germ cell tumor (size 11 mm)  K. Peritoneum, cul de sac, biopsy:  -Metastatic malignant germ cell tumor  COMMENT:  The ovarian tumor shows a mixture of dysgerminoma (70%) and yolk sac tumor (30%). Similarly, the omental, peritoneal and lymph node metastases show both components, although yolk sac tumor composes the majority of the metastatic tumor volume. Neither embryonal carcinoma nor choriocarcinoma is identified.        Results for FREDERICK LEWIS (MRN [id number]) as of 11/13/2015 12:22   Ref. Range  8/15/2015 08:35  9/8/2015 13:25  9/14/2015 08:08  10/5/2015 08:20  10/26/2015  11/16/2015  12/14/2015      Latest Range: 0-30 U/mL  202 (H)  62 (H)  53 (H)  14  12  14  11      Results for FREDERICK LEWIS (MRN [id number]) as of 11/13/2015 12:22   Ref. Range  8/15/2015 08:35  9/8/2015 13:25  9/14/2015 08:08  10/5/2015 08:20   10/26/15  11/16/15  [id number]    Alpha Fetoprotein  Latest Range: 0-8 ug/L  7592.7 (H)  254.5 (H)  241.1 (H)  75.0 (H)  6.1  3.3  2.8        9/14/15 Cycle 1 BEP   9/29/15 Cycle 2 BEP  10/5/15: C2D5 Etoposide cisplatin, bleomycin   10/19/15: C2D15 bleomycin  10/21/15: C2D17 BEP, doing well  11/13/15: C3D17 BEP  11/23/15: C4D1 BEP   11/30/15: C4 D15 BEP  CT c/a/p  IMPRESSION:   1. Postsurgical changes status post removal of the mixed germ cell  tumor and left salpingo-oophorectomy with lymph node dissection as  above.  2. Unchanged left apical 4 mm groundglass pleural-based nodule. No  definite evidence of recurrence or metastasis in chest, abdomen, or pelvis    3/17/16: Surveillance visit. Pap NIL. , AFP 2.9,  8, and beta HCG <3  16: AFP 2.8, beta HCG <3,  10,   16: AFP 2.4, beta HCG <3,  10,   16: AFP 2.1, beta HCG <3,  18; , Pap NIL, HPV negative  3/10/17: , AFP 3.7, beta HCG <3,  12  17: ,  11, beta HCG <3, AFP <1.5  17: ,  pending, beta HCG pending, AFP pending    Past Medical History:   Diagnosis Date     Contact dermatitis and other eczema, due to unspecified cause      Other  infants, unspecified (weight)(765.10)     born about 35 week - in NICU for 2 weeks - breathing issues.      Pelvic mass        Past Surgical History:   Procedure Laterality Date     LAPAROTOMY, TUMOR DEBULKING, COMBINED N/A 2015    Procedure: COMBINED LAPAROTOMY, TUMOR DEBULKING;  Surgeon: Vita Jordan MD;  Location: [facility] OR     OMENTECTOMY N/A 2015    Procedure: OMENTECTOMY;  Surgeon: Vita Jordan MD;  Location: [facility] OR     SALPINGO-OOPHORECTOMY, DISSECT LYMPH NODE Left 2015    Procedure: SALPINGO-OOPHORECTOMY, DISSECT LYMPH NODE;  Surgeon: Vita Jordan MD;  Location:  OR     SURGICAL HISTORY OF -       no surgeries       Social History     Social History     Marital status: Single     Spouse  name: N/A     Number of children: N/A     Years of education: N/A     Occupational History     Not on file.     Social History Main Topics     Smoking status: Never Smoker     Smokeless tobacco: Never Used     Alcohol use 0.0 oz/week     0 Standard drinks or equivalent per week      Comment: occasional     Drug use: No     Sexual activity: Yes     Partners: Male     Other Topics Concern     Not on file     Social History Narrative       Family History   Problem Relation Age of Onset     C.A.D. No family hx of      Hypertension No family hx of      Asthma No family hx of      Breast Cancer No family hx of      Cancer - colorectal No family hx of      Blood Disease No family hx of      no DVT/PE      Anesthesia Reaction No family hx of      DIABETES Father        Current Outpatient Prescriptions   Medication     UNABLE TO FIND     No current facility-administered medications for this visit.           Allergies   Allergen Reactions     Cats          ROS  General: Denies fatigue, changes in weight, weakness, appetite changes, night sweats, hot flashes, fever, chills, or difficulty sleeping  HEENT: Denies headaches, tinnitus, hair loss, visual difficulty or disturbances, spots or floaters, diplopia, masses, head injury, tinnitus, hearing loss, epistaxis, congestion, problems with teeth or gums, dysphonia, or dysphagia  Breast: See HPI.  Pulmonary: Denies cough, sputum, hemoptysis, shortness of breath, dyspnea on exertion, wheezing, or allergies  Cardiovascular: Denies chest pain, fainting, palpitations, murmurs, activity intolerance, swelling in legs, or high blood pressure  Gastrointestinal: Denies nausea, vomiting, constipation, diarrhea, abdominal pain, bloating, heartburn, melena, hematochezia, or jaundice  Genitourinary: Denies dysuria, urinary urgency or frequency, hematuria, cloudy or malodorous urine, incontinence, repeat urinary tract infections, flank pain, pelvic pain, irregular vaginal bleeding, vaginal  "discharge, or vaginal dryness  Sexual Function: Denies pain with intercourse, changes in libido, arousal difficulty, or changes in orgasm  Integumentary: Denies rashes, sores, changing moles, or scarring  Hematologic: Denies swollen lymph nodes, masses, easy bruising, or easy bleeding  Musculoskeletal: Denies falls, back pain, myalgias, arthralgias, stiffness, muscle weakness or muscle cramps  Neurologic: Denies numbness or tingling, changes in memory, difficulty with walking, dizziness, seizures, or tremors  Psychiatric: Denies anxiety, depression, nervousness, mood changes, suicidal thoughts, or difficulty concentrating  Endocrine: Denies polydipsia, polyuria, temperature intolerance, or history of thyroid disease      OBJECTIVE:    Physical Exam:    /79  Pulse 103  Temp 98.3  F (36.8  C) (Oral)  Resp 16  Ht 1.575 m (5' 2.01\")  Wt 120.7 kg (266 lb)  LMP 08/01/2017 (Approximate)  SpO2 97%  BMI 48.64 kg/m2    General: Alert non-toxic appearing female in no acute distress  HEENT: Normocephalic, atraumatic; PERRLA; no scleral icterus; oropharynx pink without lesions; neck supple without lymphadenopathy  Pulmonary: Lungs clear to auscultation, no increased work of breathing noted  CV/PV: Regular rate and rhythm, S1S2, no clicks, murmurs, rubs, or gallops; bilateral lower extremities without edema  GI: Normoactive bowel sounds x4 quadrants, abdomen obese, soft, non-distended, and non-tender to palpation without masses or organomegaly  : External genitalia and urethral meatus pink without lesions, masses, or excoriation. Vagina pink and moist without masses or lesions. Cervix without masses or polyps, os patent with clear discharge and visible IUD strings. Bimanual exam reveals no masses, fullness, or cervical motion tenderness. Rectovaginal exam confirms these findings.  Heme: Cervical, supraclavicular, and inguinal lymph nodes without lymphadenopathy  Neuro: Grossly intact, normal gait  Skin: " Appropriate color for race, warm and dry, no rashes or lesions to unclothed skin  Psych: Pleasant and interactive, affect bright, makes appropriate eye contact, thought process linear    Labs:    AFP pending   pending  Beta HCG tumor marker pending      Assessment/Plan:  1) Stage IIIC mixed germ cell tumor, left ovary: No signs of recurrence on physical exam or history, tumor markers pending. Continue surveillance every three months x2 years (12/2017) followed by yearly surveillance visits through 12/2020 per NCCN guidelines. Reviewed signs and symptoms  of recurrence including but not limited to bleeding from vagina, bladder, or rectum, bloating, early satiety, swelling in the lower extremities, abdominal or lower back pain, changes in bowel or bladder patterns, shortness of breath, increased fatigue, unexplained weight loss, and night sweats. Reviewed signs and symptoms for when she should contact the clinic or seek additional care. Patient to contact the clinic with any questions or concerns in the interim.  2) Genetic testing: Does not meet criteria for testing  3) Labs: LDH, AFP, , beta HCG tumor marker  4) Health maintenance issues discussed today include to follow up with PCP for co-morbid conditions and non-gynecologic issues.  5) Patient verbalized understanding of and agreement with plan.    20 minutes face to face time spent with patient, over 50% of which was spent in counseling and coordination of care.    WEN Reagan, FNP-C  Family Nurse Practitioner  Gynecologic Oncology  [facility]  Pager: [phone]

## 2017-09-08 NOTE — NURSING NOTE
"Oncology Rooming Note    September 8, 2017 1:41 PM   Dianna Vasquez is a 21 year old female who presents for:    Chief Complaint   Patient presents with     Blood Draw     venipuncture      Oncology Clinic Visit     3 Mo F/U, Encounter for follow-up surveillance of ovarian cancer     Initial Vitals: /79  Pulse 103  Temp 98.3  F (36.8  C) (Oral)  Resp 16  Ht 1.575 m (5' 2.01\")  Wt 120.7 kg (266 lb)  LMP 08/01/2017 (Approximate)  SpO2 97%  BMI 48.64 kg/m2 Estimated body mass index is 48.64 kg/(m^2) as calculated from the following:    Height as of this encounter: 1.575 m (5' 2.01\").    Weight as of this encounter: 120.7 kg (266 lb). Body surface area is 2.3 meters squared.  No Pain (0) Comment: Data Unavailable   Patient's last menstrual period was 08/01/2017 (approximate).  Allergies reviewed: Yes  Medications reviewed: Yes    Medications: Medication refills not needed today.  Pharmacy name entered into [facility]:    [facility] - [place], [place] - [address] AT [facility] OF [address] [place] [address] [facility] [place] - [place], [place] - [address] - [place], [place] - [address] [facility] - MAIL ORDER ONLY - [place], [place]    Clinical concerns: None Rosita Waggoner was NOT notified.    7 minutes for nursing intake (face to face time)     Fatimah Lopez LPN              "

## 2017-09-11 LAB — HCG-TM SERPL-ACNC: <3 IU/L

## 2017-12-28 NOTE — PROGRESS NOTES
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 She completed fourth cycle of BEP 12/2015. She presents today for a three month surveillance visit.    HPI: Dianna comes to the clinic feeling well without gynecologic concern. She reports light but regular periods but this has been normal for her since having a copper IUD placed. She is sexually active with her boyfriend, denies dyspareunia or post-coital bleeding. She is back in school- majoring in studio art and history, hoping to graduate next year. Follows up with family practice as needed at school. Denies unintended weight loss, fatigue, weakness, changes in vision or hearing, shortness of breath, cough, chest pain, abdominal pain, dyspepsia, nausea, vomiting, constipation, diarrhea, bloating, dysuria, urinary frequency or urgency, hematuria, pelvic pain, lower back pain, vaginal discharge,numbness or tingling, or swelling of the extremities.       Brief Oncology History:  GYN History:  7/30/15: She was seen in the ED due to pelvic pain. History of irregular vaginal bleeding with positive pregnancy test. Further work up with US remarkable for 20cm pelvic mass    8/12/2015 CT scan:  Pelvic mass approximately 21 cm transverse, 14 cm AP and 20 cm craniocaudal dimension. This has cystic or necrotic areas within it with thick heterogeneous soft tissue rim.This is felt to be a large tumor, probably right ovarian origin. There is an approximately 6 x 5 x 6 cm left adnexal heterogeneous mass consistent with a smaller left ovarian mass suspicious for tumor as well. Retroperitoneal adenopathy. Small amount of free fluid in the  pelvis. No pelvic adenopathy    8/12/2015 Pelvic US  The uterus measures 6.6 x 4.2 x 4.7cm. No fibroids are present. Endometrial stripe thickness is 0.6 cm. Remaining findings as above in CT scan.    8/15/15: CEA (1.8), CA-125 ( 202), LDH (2340), hCG (51), AFP  (3,893)    8/14/15: CT abdomen and pelvis   IMPRESSION:   1. Very large right abdomen and pelvis and smaller left adnexal region mass with retroperitoneal adenopathy and small ascites. Findings are compatible with malignancy, likely bilateral ovarian tumor, greater on  the right. [Critical Result: Large mid to right abdominal mass and smaller 6 cm left adnexal mass concerning for ovarian malignancy with small free fluid and periaortic adenopathy concerning for metastasis.]    8/24/15: exploratory laparotomy, tumor debulking, left salpingo-oophorectomy, complete omentectomy, left pelvic and para aortic lymph node dissection, and bladder peritoneum stripping  Cytology:   CYTOLOGIC INTERPRETATION:  A. Pelvic Washings: Positive for Malignancy  - Consistent with germ cell tumor (dysgerminoma)Specimen Adequacy: Satisfactory for evaluation.  B. Diaphragm fluid, Left Washings: Negative for malignancy Specimen Adequacy: Satisfactory for evaluation.  C. Diaphragm fluid, Right Washings: Negative for malignancy Specimen Adequacy: Satisfactory for evaluation.  Pathology:  SPECIMEN(S):  A: Bladder peritoneum  B: Left ovary and fallopian tube  C: Left fallopian tube  D: Left pelvic peritoneum  E: Omentum  F: Omental nodule  G: Lymph nodes, left periaortic  H: Lymph node, left infrarenal  I: Lymph nodes, left pelvic  J: Right bladder peritoneum nodule  K: Cul-de-sac biopsy, posterior  FINAL DIAGNOSIS:  A. Peritoneum, bladder, excision:  -Metastatic malignant germ cell tumor (fragmented, 13.1 cm in aggregate)  B. 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 Fallopian tube, left, salpingectomy:  -Smooth muscle and mesothelium  -No fallopian tube identified  -Uninvolved by tumor  D. Peritoneum, left pelvic, biopsy:  -Metastatic  malignant germ cell tumor  E. Omentum, omentectomy:  -Metastatic malignant germ cell tumor, size 2 mm  F. Omentum, nodule, excision:  -Metastatic malignant germ cell tumor  -Fragmented specimen (largest intact piece of tumor: 1 cm)  G. Lymph nodes, left periaortic, dissection:  -Metastatic malignant germ cell tumor in four of ten lymph nodes (4/10)  -Largest metastasis is 12 mm in greatest dimension  -Extranodal extension present  -Malignant germ cell tumor also present as detached tumor fragments, without identifiable lymph node  H. Lymph nodes, left infrarenal, dissection:  -Metastatic malignant germ cell tumor in two of five lymph nodes (2/5)  -Larger metastasis is 7 mm in greatest dimension  -Extranodal extension present  I. Lymph nodes, left pelvic, dissection:  -Metastatic malignant germ cell tumor in one of six lymph nodes (1/6)  -Metastasis is 4.5 mm in greatest dimension  J. Peritoneum, right bladder, biopsy:  -Metastatic malignant germ cell tumor (size 11 mm)  K. Peritoneum, cul de sac, biopsy:  -Metastatic malignant germ cell tumor  COMMENT:  The ovarian tumor shows a mixture of dysgerminoma (70%) and yolk sac tumor (30%). Similarly, the omental, peritoneal and lymph node metastases show both components, although yolk sac tumor composes the majority of the metastatic tumor volume. Neither embryonal carcinoma nor choriocarcinoma is identified.        Results for FREDERICK LEWIS (MRN [id number]) as of 11/13/2015 12:22   Ref. Range  8/15/2015 08:35  9/8/2015 13:25  9/14/2015 08:08  10/5/2015 08:20  10/26/2015  11/16/2015  12/14/2015      Latest Range: 0-30 U/mL  202 (H)  62 (H)  53 (H)  14  12  14  11      Results for FREDERICK LEWIS (MRN [id number]) as of 11/13/2015 12:22   Ref. Range  8/15/2015 08:35  9/8/2015 13:25  9/14/2015 08:08  10/5/2015 08:20  10/26/15  11/16/15  [id number]    Alpha Fetoprotein  Latest Range: 0-8 ug/L  7592.7 (H)  254.5 (H)  241.1 (H)  75.0 (H)  6.1  3.3  2.8        9/14/15 Cycle 1 BEP    9/29/15 Cycle 2 BEP  10/5/15: C2D5 Etoposide cisplatin, bleomycin   10/19/15: C2D15 bleomycin  10/21/15: C2D17 BEP, doing well  11/13/15: C3D17 BEP  11/23/15: C4D1 BEP   11/30/15: C4 D15 BEP  CT c/a/p  IMPRESSION:   1. Postsurgical changes status post removal of the mixed germ cell  tumor and left salpingo-oophorectomy with lymph node dissection as  above.  2. Unchanged left apical 4 mm groundglass pleural-based nodule. No  definite evidence of recurrence or metastasis in chest, abdomen, or pelvis    3/17/16: Surveillance visit. Pap NIL. , AFP 2.9,  8, and beta HCG <3  16: AFP 2.8, beta HCG <3,  10,   16: AFP 2.4, beta HCG <3,  10,   16: AFP 2.1, beta HCG <3,  18; , Pap NIL, HPV negative  3/10/17: , AFP 3.7, beta HCG <3,  12  17: ,  11, beta HCG <3, AFP <1.5  17: ,  11, beta HCG <3, AFP 1.5  17: ,  pending, beta HCG pending, AFP pending    Past Medical History:   Diagnosis Date     Contact dermatitis and other eczema, due to unspecified cause      Pelvic mass       NB NEC/wt     born about 35 week - in NICU for 2 weeks - breathing issues.        Past Surgical History:   Procedure Laterality Date     LAPAROTOMY, TUMOR DEBULKING, COMBINED N/A 2015    Procedure: COMBINED LAPAROTOMY, TUMOR DEBULKING;  Surgeon: Vita Jordan MD;  Location: [facility] OR     OMENTECTOMY N/A 2015    Procedure: OMENTECTOMY;  Surgeon: Vita Jordan MD;  Location: [facility] OR     SALPINGO-OOPHORECTOMY, DISSECT LYMPH NODE Left 2015    Procedure: SALPINGO-OOPHORECTOMY, DISSECT LYMPH NODE;  Surgeon: Vita Jordan MD;  Location: [facility] OR     SURGICAL HISTORY OF -       no surgeries       Social History     Social History     Marital status: Single     Spouse name: N/A     Number of children: N/A     Years of education: N/A     Occupational History     Not on file.     Social History Main Topics      Smoking status: Never Smoker     Smokeless tobacco: Never Used     Alcohol use 0.0 oz/week     0 Standard drinks or equivalent per week      Comment: occasional     Drug use: No     Sexual activity: Yes     Partners: Male     Other Topics Concern     Not on file     Social History Narrative       Family History   Problem Relation Age of Onset     C.A.D. No family hx of      Hypertension No family hx of      Asthma No family hx of      Breast Cancer No family hx of      Cancer - colorectal No family hx of      Blood Disease No family hx of      no DVT/PE      Anesthesia Reaction No family hx of      DIABETES Father        Current Outpatient Prescriptions   Medication     UNABLE TO FIND     No current facility-administered medications for this visit.           Allergies   Allergen Reactions     Cats          ROS  General: Denies fatigue, changes in weight, weakness, appetite changes, night sweats, hot flashes, fever, chills, or difficulty sleeping  HEENT: Denies headaches, tinnitus, hair loss, visual difficulty or disturbances, spots or floaters, diplopia, masses, head injury, tinnitus, hearing loss, epistaxis, congestion, problems with teeth or gums, dysphonia, or dysphagia  Pulmonary: Denies cough, sputum, hemoptysis, shortness of breath, dyspnea on exertion, wheezing, or allergies  Cardiovascular: Denies chest pain, fainting, palpitations, murmurs, activity intolerance, swelling in legs, or high blood pressure  Gastrointestinal: Denies nausea, vomiting, constipation, diarrhea, abdominal pain, bloating, heartburn, melena, hematochezia, or jaundice  Genitourinary: Denies dysuria, urinary urgency or frequency, hematuria, cloudy or malodorous urine, incontinence, repeat urinary tract infections, flank pain, pelvic pain, irregular vaginal bleeding, vaginal discharge, or vaginal dryness  Sexual Function: Denies pain with intercourse, changes in libido, arousal difficulty, or changes in orgasm  Integumentary: Denies  rashes, sores, changing moles, or scarring  Hematologic: Denies swollen lymph nodes, masses, easy bruising, or easy bleeding  Musculoskeletal: Denies falls, back pain, myalgias, arthralgias, stiffness, muscle weakness or muscle cramps  Neurologic: Denies numbness or tingling, changes in memory, difficulty with walking, dizziness, seizures, or tremors  Psychiatric: Denies anxiety, depression, nervousness, mood changes, suicidal thoughts, or difficulty concentrating  Endocrine: Denies polydipsia, polyuria, temperature intolerance, or history of thyroid disease      OBJECTIVE:    Physical Exam:    /73  Pulse 92  Temp 98.8  F (37.1  C) (Oral)  Resp 18  Wt 117.8 kg (259 lb 12.8 oz)  SpO2 94%  BMI 47.51 kg/m2    General: Alert non-toxic appearing female in no acute distress  HEENT: Normocephalic, atraumatic; PERRLA; no scleral icterus; oropharynx pink without lesions; neck supple without lymphadenopathy  Pulmonary: Lungs clear to auscultation, no increased work of breathing noted  CV/PV: Regular rate and rhythm, S1S2, no clicks, murmurs, rubs, or gallops; bilateral lower extremities without edema  GI: Normoactive bowel sounds x4 quadrants, abdomen obese, soft, non-distended, and non-tender to palpation without masses or organomegaly  : External genitalia and urethral meatus pink without lesions, masses, or excoriation. Vagina pink and moist without masses or lesions. Cervix without masses or polyps, os patent with clear discharge and visible IUD strings. Bimanual exam reveals no masses, fullness, or cervical motion tenderness. Somewhat limited due to body habitus. Rectovaginal exam confirms these findings.  Heme: Cervical, supraclavicular, and inguinal lymph nodes without lymphadenopathy  Neuro: Grossly intact, normal gait  Skin: Appropriate color for race, warm and dry, no rashes or lesions to unclothed skin  Psych: Pleasant and interactive, affect bright, makes appropriate eye contact, thought process  linear    Labs:    AFP pending   pending  Beta HCG tumor marker pending      Assessment/Plan:  1) Stage IIIC mixed germ cell tumor, left ovary: No signs of recurrence on physical exam or history, tumor markers pending. May transition to yearly surveillance visits through 12/2020 per NCCN guidelines. Per NCCN guidelines, tumor markers no longer need to be drawn after two years. Reviewed signs and symptoms  of recurrence including but not limited to bleeding from vagina, bladder, or rectum, bloating, early satiety, swelling in the lower extremities, abdominal or lower back pain, changes in bowel or bladder patterns, shortness of breath, increased fatigue, unexplained weight loss, and night sweats. Reviewed signs and symptoms for when she should contact the clinic or seek additional care. Patient to contact the clinic with any questions or concerns in the interim.  2) Genetic testing: Does not meet criteria for testing  3) Labs: LDH, AFP, , beta HCG tumor marker  4) Health maintenance issues discussed today include to follow up with PCP for co-morbid conditions and non-gynecologic issues.  5) Patient verbalized understanding of and agreement with plan.    20 minutes face to face time spent with patient, over 50% of which was spent in counseling and coordination of care.    WEN Reagan, FNP-C  Family Nurse Practitioner  Gynecologic Oncology  [facility]  Pager: [phone]

## 2017-12-29 ENCOUNTER — ONCOLOGY VISIT (OUTPATIENT)
Dept: ONCOLOGY | Facility: CLINIC | Age: 22
End: 2017-12-29
Attending: NURSE PRACTITIONER
Payer: COMMERCIAL

## 2017-12-29 VITALS
OXYGEN SATURATION: 94 % | DIASTOLIC BLOOD PRESSURE: 73 MMHG | HEART RATE: 92 BPM | RESPIRATION RATE: 18 BRPM | WEIGHT: 259.8 LBS | TEMPERATURE: 98.8 F | BODY MASS INDEX: 47.51 KG/M2 | SYSTOLIC BLOOD PRESSURE: 106 MMHG

## 2017-12-29 DIAGNOSIS — C80.1 MIXED GERM CELL TUMOR (H): ICD-10-CM

## 2017-12-29 DIAGNOSIS — Z85.43 ENCOUNTER FOR FOLLOW-UP SURVEILLANCE OF OVARIAN CANCER: ICD-10-CM

## 2017-12-29 DIAGNOSIS — Z08 ENCOUNTER FOR FOLLOW-UP SURVEILLANCE OF OVARIAN CANCER: Primary | ICD-10-CM

## 2017-12-29 DIAGNOSIS — Z08 ENCOUNTER FOR FOLLOW-UP SURVEILLANCE OF OVARIAN CANCER: ICD-10-CM

## 2017-12-29 DIAGNOSIS — C56.2 OVARIAN CANCER, LEFT (H): ICD-10-CM

## 2017-12-29 DIAGNOSIS — Z85.43 ENCOUNTER FOR FOLLOW-UP SURVEILLANCE OF OVARIAN CANCER: Primary | ICD-10-CM

## 2017-12-29 LAB
AFP SERPL-MCNC: 1.9 UG/L (ref 0–8)
CANCER AG125 SERPL-ACNC: 8 U/ML (ref 0–30)
LDH SERPL L TO P-CCNC: 250 U/L (ref 81–234)

## 2017-12-29 PROCEDURE — 99212 OFFICE O/P EST SF 10 MIN: CPT | Mod: ZF

## 2017-12-29 PROCEDURE — 36415 COLL VENOUS BLD VENIPUNCTURE: CPT

## 2017-12-29 PROCEDURE — 83615 LACTATE (LD) (LDH) ENZYME: CPT | Performed by: NURSE PRACTITIONER

## 2017-12-29 PROCEDURE — 84702 CHORIONIC GONADOTROPIN TEST: CPT | Performed by: NURSE PRACTITIONER

## 2017-12-29 PROCEDURE — 86304 IMMUNOASSAY TUMOR CA 125: CPT | Performed by: NURSE PRACTITIONER

## 2017-12-29 PROCEDURE — 82105 ALPHA-FETOPROTEIN SERUM: CPT | Performed by: NURSE PRACTITIONER

## 2017-12-29 PROCEDURE — 99213 OFFICE O/P EST LOW 20 MIN: CPT | Mod: ZP | Performed by: NURSE PRACTITIONER

## 2017-12-29 ASSESSMENT — PAIN SCALES - GENERAL: PAINLEVEL: NO PAIN (0)

## 2017-12-29 NOTE — LETTER
12/29/2017     RE: Dianna Vasquez  [address]  [facility] [address]     Dear Colleague,    Thank you for referring your patient, Dianna Vasquez, to the [facility]. Please see a copy of my visit note below.    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 She completed fourth cycle of BEP 12/2015. She presents today for a three month surveillance visit.    HPI: Dianna comes to the clinic feeling well without gynecologic concern. She reports light but regular periods but this has been normal for her since having a copper IUD placed. She is sexually active with her boyfriend, denies dyspareunia or post-coital bleeding. She is back in school- majoring in studio art and history, hoping to graduate next year. Follows up with family practice as needed at school. Denies unintended weight loss, fatigue, weakness, changes in vision or hearing, shortness of breath, cough, chest pain, abdominal pain, dyspepsia, nausea, vomiting, constipation, diarrhea, bloating, dysuria, urinary frequency or urgency, hematuria, pelvic pain, lower back pain, vaginal discharge,numbness or tingling, or swelling of the extremities.       Brief Oncology History:  GYN History:  7/30/15: She was seen in the ED due to pelvic pain. History of irregular vaginal bleeding with positive pregnancy test. Further work up with US remarkable for 20cm pelvic mass    8/12/2015 CT scan:  Pelvic mass approximately 21 cm transverse, 14 cm AP and 20 cm craniocaudal dimension. This has cystic or necrotic areas within it with thick heterogeneous soft tissue rim.This is felt to be a large tumor, probably right ovarian origin. There is an approximately 6 x 5 x 6 cm left adnexal heterogeneous mass consistent with a smaller left ovarian mass suspicious for tumor as well. Retroperitoneal adenopathy. Small amount of free fluid in the  pelvis. No pelvic adenopathy    8/12/2015 Pelvic  US  The uterus measures 6.6 x 4.2 x 4.7cm. No fibroids are present. Endometrial stripe thickness is 0.6 cm. Remaining findings as above in CT scan.    8/15/15: CEA (1.8), CA-125 ( 202), LDH (2340), hCG (51), AFP (7,592)    8/14/15: CT abdomen and pelvis   IMPRESSION:   1. Very large right abdomen and pelvis and smaller left adnexal region mass with retroperitoneal adenopathy and small ascites. Findings are compatible with malignancy, likely bilateral ovarian tumor, greater on  the right. [Critical Result: Large mid to right abdominal mass and smaller 6 cm left adnexal mass concerning for ovarian malignancy with small free fluid and periaortic adenopathy concerning for metastasis.]    8/24/15: exploratory laparotomy, tumor debulking, left salpingo-oophorectomy, complete omentectomy, left pelvic and para aortic lymph node dissection, and bladder peritoneum stripping  Cytology:   CYTOLOGIC INTERPRETATION:  A. Pelvic Washings: Positive for Malignancy  - Consistent with germ cell tumor (dysgerminoma)Specimen Adequacy: Satisfactory for evaluation.  B. Diaphragm fluid, Left Washings: Negative for malignancy Specimen Adequacy: Satisfactory for evaluation.  C. Diaphragm fluid, Right Washings: Negative for malignancy Specimen Adequacy: Satisfactory for evaluation.  Pathology:  SPECIMEN(S):  A: Bladder peritoneum  B: Left ovary and fallopian tube  C: Left fallopian tube  D: Left pelvic peritoneum  E: Omentum  F: Omental nodule  G: Lymph nodes, left periaortic  H: Lymph node, left infrarenal  I: Lymph nodes, left pelvic  J: Right bladder peritoneum nodule  K: Cul-de-sac biopsy, posterior  FINAL DIAGNOSIS:  A. Peritoneum, bladder, excision:  -Metastatic malignant germ cell tumor (fragmented, 13.1 cm in aggregate)  B. 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 Fallopian tube, left, salpingectomy:  -Smooth muscle and mesothelium  -No fallopian tube identified  -Uninvolved by tumor  D. Peritoneum, left pelvic, biopsy:  -Metastatic malignant germ cell tumor  E. Omentum, omentectomy:  -Metastatic malignant germ cell tumor, size 2 mm  F. Omentum, nodule, excision:  -Metastatic malignant germ cell tumor  -Fragmented specimen (largest intact piece of tumor: 1 cm)  G. Lymph nodes, left periaortic, dissection:  -Metastatic malignant germ cell tumor in four of ten lymph nodes (4/10)  -Largest metastasis is 12 mm in greatest dimension  -Extranodal extension present  -Malignant germ cell tumor also present as detached tumor fragments, without identifiable lymph node  H. Lymph nodes, left infrarenal, dissection:  -Metastatic malignant germ cell tumor in two of five lymph nodes (2/5)  -Larger metastasis is 7 mm in greatest dimension  -Extranodal extension present  I. Lymph nodes, left pelvic, dissection:  -Metastatic malignant germ cell tumor in one of six lymph nodes (1/6)  -Metastasis is 4.5 mm in greatest dimension  J. Peritoneum, right bladder, biopsy:  -Metastatic malignant germ cell tumor (size 11 mm)  K. Peritoneum, cul de sac, biopsy:  -Metastatic malignant germ cell tumor  COMMENT:  The ovarian tumor shows a mixture of dysgerminoma (70%) and yolk sac tumor (30%). Similarly, the omental, peritoneal and lymph node metastases show both components, although yolk sac tumor composes the majority of the metastatic tumor volume. Neither embryonal carcinoma nor choriocarcinoma is identified.        Results for FREDERICK LEWIS (MRN [id number]) as of 11/13/2015 12:22   Ref. Range  8/15/2015 08:35  9/8/2015 13:25  9/14/2015 08:08  10/5/2015 08:20  10/26/2015  11/16/2015  12/14/2015      Latest Range: 0-30 U/mL  202 (H)  62 (H)  53 (H)  14  12  14  11      Results for FREDERICK LEWIS (MRN [id number]) as of 11/13/2015 12:22   Ref. Range  8/15/2015 08:35   2015 13:25  2015 08:08  10/5/2015 08:20  10/26/15  11/16/15  [id number]    Alpha Fetoprotein  Latest Range: 0-8 ug/L  7592.7 (H)  254.5 (H)  241.1 (H)  75.0 (H)  6.1  3.3  2.8      9/14/15 Cycle 1 BEP   9/29/15 Cycle 2 BEP  10/5/15: C2D5 Etoposide cisplatin, bleomycin   10/19/15: C2D15 bleomycin  10/21/15: C2D17 BEP, doing well  11/13/15: C3D17 BEP  11/23/15: C4D1 BEP   11/30/15: C4 D15 BEP  CT c/a/p  IMPRESSION:   1. Postsurgical changes status post removal of the mixed germ cell  tumor and left salpingo-oophorectomy with lymph node dissection as  above.  2. Unchanged left apical 4 mm groundglass pleural-based nodule. No  definite evidence of recurrence or metastasis in chest, abdomen, or pelvis    3/17/16: Surveillance visit. Pap NIL. , AFP 2.9,  8, and beta HCG <3  16: AFP 2.8, beta HCG <3,  10,   16: AFP 2.4, beta HCG <3,  10,   16: AFP 2.1, beta HCG <3,  18; , Pap NIL, HPV negative  3/10/17: , AFP 3.7, beta HCG <3,  12  17: ,  11, beta HCG <3, AFP <1.5  17: ,  11, beta HCG <3, AFP 1.5  17: ,  pending, beta HCG pending, AFP pending    Past Medical History:   Diagnosis Date     Contact dermatitis and other eczema, due to unspecified cause      Pelvic mass       NB NEC/wt     born about 35 week - in NICU for 2 weeks - breathing issues.        Past Surgical History:   Procedure Laterality Date     LAPAROTOMY, TUMOR DEBULKING, COMBINED N/A 2015    Procedure: COMBINED LAPAROTOMY, TUMOR DEBULKING;  Surgeon: Vita Jordan MD;  Location: [facility] OR     OMENTECTOMY N/A 2015    Procedure: OMENTECTOMY;  Surgeon: Vita Jordan MD;  Location: [facility] OR     SALPINGO-OOPHORECTOMY, DISSECT LYMPH NODE Left 2015    Procedure: SALPINGO-OOPHORECTOMY, DISSECT LYMPH NODE;  Surgeon: Vita Jordan MD;  Location: [facility] OR     SURGICAL HISTORY OF -       no surgeries       Social  History     Social History     Marital status: Single     Spouse name: N/A     Number of children: N/A     Years of education: N/A     Occupational History     Not on file.     Social History Main Topics     Smoking status: Never Smoker     Smokeless tobacco: Never Used     Alcohol use 0.0 oz/week     0 Standard drinks or equivalent per week      Comment: occasional     Drug use: No     Sexual activity: Yes     Partners: Male     Other Topics Concern     Not on file     Social History Narrative     Family History   Problem Relation Age of Onset     C.A.D. No family hx of      Hypertension No family hx of      Asthma No family hx of      Breast Cancer No family hx of      Cancer - colorectal No family hx of      Blood Disease No family hx of      no DVT/PE      Anesthesia Reaction No family hx of      DIABETES Father      Current Outpatient Prescriptions   Medication     UNABLE TO FIND     No current facility-administered medications for this visit.       Allergies   Allergen Reactions     Cats        ROS  General: Denies fatigue, changes in weight, weakness, appetite changes, night sweats, hot flashes, fever, chills, or difficulty sleeping  HEENT: Denies headaches, tinnitus, hair loss, visual difficulty or disturbances, spots or floaters, diplopia, masses, head injury, tinnitus, hearing loss, epistaxis, congestion, problems with teeth or gums, dysphonia, or dysphagia  Pulmonary: Denies cough, sputum, hemoptysis, shortness of breath, dyspnea on exertion, wheezing, or allergies  Cardiovascular: Denies chest pain, fainting, palpitations, murmurs, activity intolerance, swelling in legs, or high blood pressure  Gastrointestinal: Denies nausea, vomiting, constipation, diarrhea, abdominal pain, bloating, heartburn, melena, hematochezia, or jaundice  Genitourinary: Denies dysuria, urinary urgency or frequency, hematuria, cloudy or malodorous urine, incontinence, repeat urinary tract infections, flank pain, pelvic pain,  irregular vaginal bleeding, vaginal discharge, or vaginal dryness  Sexual Function: Denies pain with intercourse, changes in libido, arousal difficulty, or changes in orgasm  Integumentary: Denies rashes, sores, changing moles, or scarring  Hematologic: Denies swollen lymph nodes, masses, easy bruising, or easy bleeding  Musculoskeletal: Denies falls, back pain, myalgias, arthralgias, stiffness, muscle weakness or muscle cramps  Neurologic: Denies numbness or tingling, changes in memory, difficulty with walking, dizziness, seizures, or tremors  Psychiatric: Denies anxiety, depression, nervousness, mood changes, suicidal thoughts, or difficulty concentrating  Endocrine: Denies polydipsia, polyuria, temperature intolerance, or history of thyroid disease    OBJECTIVE:    Physical Exam:    /73  Pulse 92  Temp 98.8  F (37.1  C) (Oral)  Resp 18  Wt 117.8 kg (259 lb 12.8 oz)  SpO2 94%  BMI 47.51 kg/m2    General: Alert non-toxic appearing female in no acute distress  HEENT: Normocephalic, atraumatic; PERRLA; no scleral icterus; oropharynx pink without lesions; neck supple without lymphadenopathy  Pulmonary: Lungs clear to auscultation, no increased work of breathing noted  CV/PV: Regular rate and rhythm, S1S2, no clicks, murmurs, rubs, or gallops; bilateral lower extremities without edema  GI: Normoactive bowel sounds x4 quadrants, abdomen obese, soft, non-distended, and non-tender to palpation without masses or organomegaly  : External genitalia and urethral meatus pink without lesions, masses, or excoriation. Vagina pink and moist without masses or lesions. Cervix without masses or polyps, os patent with clear discharge and visible IUD strings. Bimanual exam reveals no masses, fullness, or cervical motion tenderness. Somewhat limited due to body habitus. Rectovaginal exam confirms these findings.  Heme: Cervical, supraclavicular, and inguinal lymph nodes without lymphadenopathy  Neuro: Grossly intact,  normal gait  Skin: Appropriate color for race, warm and dry, no rashes or lesions to unclothed skin  Psych: Pleasant and interactive, affect bright, makes appropriate eye contact, thought process linear    Labs:    AFP pending   pending  Beta HCG tumor marker pending    Assessment/Plan:  1) Stage IIIC mixed germ cell tumor, left ovary: No signs of recurrence on physical exam or history, tumor markers pending. May transition to yearly surveillance visits through 12/2020 per NCCN guidelines. Per NCCN guidelines, tumor markers no longer need to be drawn after two years. Reviewed signs and symptoms  of recurrence including but not limited to bleeding from vagina, bladder, or rectum, bloating, early satiety, swelling in the lower extremities, abdominal or lower back pain, changes in bowel or bladder patterns, shortness of breath, increased fatigue, unexplained weight loss, and night sweats. Reviewed signs and symptoms for when she should contact the clinic or seek additional care. Patient to contact the clinic with any questions or concerns in the interim.  2) Genetic testing: Does not meet criteria for testing  3) Labs: LDH, AFP, , beta HCG tumor marker  4) Health maintenance issues discussed today include to follow up with PCP for co-morbid conditions and non-gynecologic issues.  5) Patient verbalized understanding of and agreement with plan.    20 minutes face to face time spent with patient, over 50% of which was spent in counseling and coordination of care.    WEN Reagan, FNP-C  Family Nurse Practitioner  Gynecologic Oncology  [facility]  Pager: [phone]

## 2017-12-29 NOTE — MR AVS SNAPSHOT
After Visit Summary   12/29/2017    Dianna Vasquez    MRN: [id number]           Patient Information     Date Of Birth          1995        Visit Information        Provider Department      12/29/2017 1:20 PM Rosita Waggoner APRN CNP [facility]        Today's Diagnoses     Encounter for follow-up surveillance of ovarian cancer    -  1       Follow-ups after your visit        Who to contact     If you have questions or need follow up information about today's clinic visit or your schedule please contact [facility] directly at [phone].  Normal or non-critical lab and imaging results will be communicated to you by nChannelhart, letter or phone within 4 business days after the clinic has received the results. If you do not hear from us within 7 days, please contact the clinic through nChannelhart or phone. If you have a critical or abnormal lab result, we will notify you by phone as soon as possible.  Submit refill requests through [employer] or call your pharmacy and they will forward the refill request to us. Please allow 3 business days for your refill to be completed.          Additional Information About Your Visit        MyChart Information     [employer] gives you secure access to your electronic health record. If you see a primary care provider, you can also send messages to your care team and make appointments. If you have questions, please call your primary care clinic.  If you do not have a primary care provider, please call [phone] and they will assist you.        Care EveryWhere ID     This is your Care EveryWhere ID. This could be used by other organizations to access your [facility] medical records  [id number]        Your Vitals Were     Pulse Temperature Respirations Pulse Oximetry BMI (Body Mass Index)       92 98.8  F (37.1  C) (Oral) 18 94% 47.51 kg/m2        Blood Pressure from Last 3 Encounters:   12/29/17 106/73   09/08/17 127/79   06/02/17 110/75     Weight from Last 3 Encounters:   12/29/17 117.8 kg (259 lb 12.8 oz)   09/08/17 120.7 kg (266 lb)   06/02/17 115.1 kg (253 lb 12.8 oz)              Today, you had the following     No orders found for display       Primary Care Provider Office Phone # Fax #    Kasey Nona Toure [phone] [address] [facility] [address]        Equal Access to Services     [facility] Center[facility] : Hadii aad ku hadasho Soomaali, waaxda luqadaha, qaybta kaalmada adeegyada, waxay idiin hayaan adeeg kharajame lajose . So [facility] [phone].    ATENCIÓN: Si habla español, tiene a mahoney disposición servicios gratuitos de asistencia lingüística. [facility] [phone].    We comply with applicable federal civil rights laws and [place] laws. We do not discriminate on the basis of race, color, national origin, age, disability, sex, sexual orientation, or gender identity.            Thank you!     Thank you for choosing [facility]  for your care. Our goal is always to provide you with excellent care. Hearing back from our patients is one way we can continue to improve our services. Please take a few minutes to complete the written survey that you may receive in the mail after your visit with us. Thank you!             Your Updated Medication List - Protect others around you: Learn how to safely use, store and throw away your medicines at www.disposemymeds.org.          This list is accurate as of: 12/29/17  2:02 PM.  Always use your most recent med list.                   Brand Name Dispense Instructions for use Diagnosis    UNABLE TO FIND      Apply topically as needed MEDICATION NAME: quadriderm cream    Pelvic mass, Abdominal pain, generalized, Elevated CA-125

## 2017-12-29 NOTE — NURSING NOTE
Chief Complaint   Patient presents with     Blood Draw     Labs only     Vitals done, labs drawn by vpt.  See doc flow sheets for details.  Fabiola Cason CMA

## 2017-12-29 NOTE — NURSING NOTE
"Oncology Rooming Note    December 29, 2017 1:06 PM   Dianna Vasquez is a 22 year old female who presents for:    Chief Complaint   Patient presents with     Oncology Clinic Visit     Return for Ovarian      Initial Vitals: /73  Pulse 92  Temp 98.8  F (37.1  C) (Oral)  Resp 18  Wt 117.8 kg (259 lb 12.8 oz)  SpO2 94%  BMI 47.51 kg/m2 Estimated body mass index is 47.51 kg/(m^2) as calculated from the following:    Height as of 9/8/17: 1.575 m (5' 2.01\").    Weight as of this encounter: 117.8 kg (259 lb 12.8 oz). Body surface area is 2.27 meters squared.  No Pain (0) Comment: Data Unavailable   No LMP recorded.  Allergies reviewed: Yes  Medications reviewed: Yes    Medications: Medication refills not needed today.  Pharmacy name entered into EPIC:    [facility] - [place], [place] - [address] [place], [place] [address]E AT [address] [facility] [place] - [place], [place] - [address] [place] [address] PHARMACY [facility] - [place], [place] - [address] [facility] - MAIL ORDER ONLY - [place], [place]    Clinical concerns: no concerns  Rosaline was notified.    6 minutes for nursing intake (face to face time)     Maki Allan MA              "

## 2018-01-02 LAB — HCG-TM SERPL-ACNC: <3 IU/L

## 2018-12-27 DIAGNOSIS — Z08 ENCOUNTER FOR FOLLOW-UP SURVEILLANCE OF OVARIAN CANCER: Primary | ICD-10-CM

## 2018-12-27 DIAGNOSIS — Z85.43 ENCOUNTER FOR FOLLOW-UP SURVEILLANCE OF OVARIAN CANCER: Primary | ICD-10-CM

## 2018-12-27 NOTE — PROGRESS NOTES
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 She completed fourth cycle of BEP 12/2015. She presents today for a three month surveillance visit.    HPI: Dianna comes to the clinic feeling well without gynecologic concern. Has a copper IUD in place, continues to have regular periods which vary in heaviness. She is sexually active with her fiance, denies dyspareunia or post-coital bleeding. Notes increased stress as she is finishing up her final year of school. Has gained 20lb this year, states this is likely due to lack of exercise and poor eating habits during times of stress.  Follows up with family practice as needed at school. Denies unintended weight loss, fatigue, weakness, changes in vision or hearing, shortness of breath, cough, chest pain, abdominal pain, dyspepsia, nausea, vomiting, constipation, diarrhea, bloating, dysuria, urinary frequency or urgency, hematuria, pelvic pain, lower back pain, vaginal discharge,numbness or tingling, or swelling of the extremities.       Brief Oncology History:  GYN History:  7/30/15: She was seen in the ED due to pelvic pain. History of irregular vaginal bleeding with positive pregnancy test. Further work up with US remarkable for 20cm pelvic mass    8/12/2015 CT scan:  Pelvic mass approximately 21 cm transverse, 14 cm AP and 20 cm craniocaudal dimension. This has cystic or necrotic areas within it with thick heterogeneous soft tissue rim.This is felt to be a large tumor, probably right ovarian origin. There is an approximately 6 x 5 x 6 cm left adnexal heterogeneous mass consistent with a smaller left ovarian mass suspicious for tumor as well. Retroperitoneal adenopathy. Small amount of free fluid in the  pelvis. No pelvic adenopathy    8/12/2015 Pelvic US  The uterus measures 6.6 x 4.2 x 4.7cm. No fibroids are present. Endometrial stripe thickness is 0.6 cm. Remaining findings as above in CT  scan.    8/15/15: CEA (1.8), CA-125 ( 202), LDH (2340), hCG (51), AFP (7,592)    8/14/15: CT abdomen and pelvis   IMPRESSION:   1. Very large right abdomen and pelvis and smaller left adnexal region mass with retroperitoneal adenopathy and small ascites. Findings are compatible with malignancy, likely bilateral ovarian tumor, greater on  the right. [Critical Result: Large mid to right abdominal mass and smaller 6 cm left adnexal mass concerning for ovarian malignancy with small free fluid and periaortic adenopathy concerning for metastasis.]    8/24/15: exploratory laparotomy, tumor debulking, left salpingo-oophorectomy, complete omentectomy, left pelvic and para aortic lymph node dissection, and bladder peritoneum stripping  Cytology:   CYTOLOGIC INTERPRETATION:  A. Pelvic Washings: Positive for Malignancy  - Consistent with germ cell tumor (dysgerminoma)Specimen Adequacy: Satisfactory for evaluation.  B. Diaphragm fluid, Left Washings: Negative for malignancy Specimen Adequacy: Satisfactory for evaluation.  C. Diaphragm fluid, Right Washings: Negative for malignancy Specimen Adequacy: Satisfactory for evaluation.  Pathology:  SPECIMEN(S):  A: Bladder peritoneum  B: Left ovary and fallopian tube  C: Left fallopian tube  D: Left pelvic peritoneum  E: Omentum  F: Omental nodule  G: Lymph nodes, left periaortic  H: Lymph node, left infrarenal  I: Lymph nodes, left pelvic  J: Right bladder peritoneum nodule  K: Cul-de-sac biopsy, posterior  FINAL DIAGNOSIS:  A. Peritoneum, bladder, excision:  -Metastatic malignant germ cell tumor (fragmented, 13.1 cm in aggregate)  B. 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 Fallopian tube, left, salpingectomy:  -Smooth muscle and mesothelium  -No fallopian tube  identified  -Uninvolved by tumor  D. Peritoneum, left pelvic, biopsy:  -Metastatic malignant germ cell tumor  E. Omentum, omentectomy:  -Metastatic malignant germ cell tumor, size 2 mm  F. Omentum, nodule, excision:  -Metastatic malignant germ cell tumor  -Fragmented specimen (largest intact piece of tumor: 1 cm)  G. Lymph nodes, left periaortic, dissection:  -Metastatic malignant germ cell tumor in four of ten lymph nodes (4/10)  -Largest metastasis is 12 mm in greatest dimension  -Extranodal extension present  -Malignant germ cell tumor also present as detached tumor fragments, without identifiable lymph node  H. Lymph nodes, left infrarenal, dissection:  -Metastatic malignant germ cell tumor in two of five lymph nodes (2/5)  -Larger metastasis is 7 mm in greatest dimension  -Extranodal extension present  I. Lymph nodes, left pelvic, dissection:  -Metastatic malignant germ cell tumor in one of six lymph nodes (1/6)  -Metastasis is 4.5 mm in greatest dimension  J. Peritoneum, right bladder, biopsy:  -Metastatic malignant germ cell tumor (size 11 mm)  K. Peritoneum, cul de sac, biopsy:  -Metastatic malignant germ cell tumor  COMMENT:  The ovarian tumor shows a mixture of dysgerminoma (70%) and yolk sac tumor (30%). Similarly, the omental, peritoneal and lymph node metastases show both components, although yolk sac tumor composes the majority of the metastatic tumor volume. Neither embryonal carcinoma nor choriocarcinoma is identified.        Results for FREDERICK LEWIS (MRN [id number]) as of 11/13/2015 12:22   Ref. Range  8/15/2015 08:35  9/8/2015 13:25  9/14/2015 08:08  10/5/2015 08:20  10/26/2015  11/16/2015  12/14/2015      Latest Range: 0-30 U/mL  202 (H)  62 (H)  53 (H)  14  12  14  11      Results for FREDERICK LEWIS (MRN [id number]) as of 11/13/2015 12:22   Ref. Range  8/15/2015 08:35  9/8/2015 13:25  9/14/2015 08:08  10/5/2015 08:20  10/26/15  11/16/15  [id number]    Alpha Fetoprotein  Latest Range: 0-8 ug/L   7592.7 (H)  254.5 (H)  241.1 (H)  75.0 (H)  6.1  3.3  2.8        9/14/15 Cycle 1 BEP   9/29/15 Cycle 2 BEP  10/5/15: C2D5 Etoposide cisplatin, bleomycin   10/19/15: C2D15 bleomycin  10/21/15: C2D17 BEP, doing well  11/13/15: C3D17 BEP  11/23/15: C4D1 BEP   11/30/15: C4 D15 BEP  CT c/a/p  IMPRESSION:   1. Postsurgical changes status post removal of the mixed germ cell  tumor and left salpingo-oophorectomy with lymph node dissection as  above.  2. Unchanged left apical 4 mm groundglass pleural-based nodule. No  definite evidence of recurrence or metastasis in chest, abdomen, or pelvis    3/17/16: Surveillance visit. Pap NIL. , AFP 2.9,  8, and beta HCG <3  16: AFP 2.8, beta HCG <3,  10,   16: AFP 2.4, beta HCG <3,  10,   16: AFP 2.1, beta HCG <3,  18; , Pap NIL, HPV negative  3/10/17: , AFP 3.7, beta HCG <3,  12  17: ,  11, beta HCG <3, AFP <1.5  17: ,  11, beta HCG <3, AFP 1.5  17: ,  8, beta HCG <3, AFP 1.9    Past Medical History:   Diagnosis Date     Contact dermatitis and other eczema, due to unspecified cause      Other  infants, unspecified (weight)(765.10)     born about 35 week - in NICU for 2 weeks - breathing issues.      Pelvic mass        Past Surgical History:   Procedure Laterality Date     LAPAROTOMY, TUMOR DEBULKING, COMBINED N/A 2015    Procedure: COMBINED LAPAROTOMY, TUMOR DEBULKING;  Surgeon: Vita Jordan MD;  Location: [facility] OR     OMENTECTOMY N/A 2015    Procedure: OMENTECTOMY;  Surgeon: Vita Jordan MD;  Location: [facility] OR     SALPINGO-OOPHORECTOMY, DISSECT LYMPH NODE Left 2015    Procedure: SALPINGO-OOPHORECTOMY, DISSECT LYMPH NODE;  Surgeon: Vita Jordan MD;  Location:  OR     SURGICAL HISTORY OF -       no surgeries       Social History     Socioeconomic History     Marital status: Single     Spouse name: Not on file     Number of  children: Not on file     Years of education: Not on file     Highest education level: Not on file   Social Needs     Financial resource strain: Not on file     Food insecurity - worry: Not on file     Food insecurity - inability: Not on file     Transportation needs - medical: Not on file     Transportation needs - non-medical: Not on file   Occupational History     Not on file   Tobacco Use     Smoking status: Never Smoker     Smokeless tobacco: Never Used   Substance and Sexual Activity     Alcohol use: Yes     Alcohol/week: 0.0 oz     Comment: occasional     Drug use: No     Sexual activity: Yes     Partners: Male   Other Topics Concern     Parent/sibling w/ CABG, MI or angioplasty before 65F 55M? Not Asked   Social History Narrative     Not on file       Family History   Problem Relation Age of Onset     C.A.D. No family hx of      Hypertension No family hx of      Asthma No family hx of      Breast Cancer No family hx of      Cancer - colorectal No family hx of      Blood Disease No family hx of         no DVT/PE      Anesthesia Reaction No family hx of      Diabetes Father        Current Outpatient Medications   Medication     UNABLE TO FIND     No current facility-administered medications for this visit.           Allergies   Allergen Reactions     Cats          ROS  General: + weight gain. Denies fatigue, weakness, appetite changes, night sweats, hot flashes, fever, chills, or difficulty sleeping  HEENT: Denies headaches, tinnitus, hair loss, visual difficulty or disturbances, spots or floaters, diplopia, masses, head injury, tinnitus, hearing loss, epistaxis, congestion, problems with teeth or gums, dysphonia, or dysphagia  Pulmonary: Denies cough, sputum, hemoptysis, shortness of breath, dyspnea on exertion, wheezing, or allergies  Cardiovascular: Denies chest pain, fainting, palpitations, murmurs, activity intolerance, swelling in legs, or high blood pressure  Gastrointestinal: Denies nausea, vomiting,  constipation, diarrhea, abdominal pain, bloating, heartburn, melena, hematochezia, or jaundice  Genitourinary: Denies dysuria, urinary urgency or frequency, hematuria, cloudy or malodorous urine, incontinence, repeat urinary tract infections, flank pain, pelvic pain, irregular vaginal bleeding, vaginal discharge, or vaginal dryness  Sexual Function: Denies pain with intercourse, changes in libido, arousal difficulty, or changes in orgasm  Integumentary: Denies rashes, sores, changing moles, or scarring  Hematologic: Denies swollen lymph nodes, masses, easy bruising, or easy bleeding  Musculoskeletal: Denies falls, back pain, myalgias, arthralgias, stiffness, muscle weakness or muscle cramps  Neurologic: Denies numbness or tingling, changes in memory, difficulty with walking, dizziness, seizures, or tremors  Psychiatric: Denies anxiety, depression, nervousness, mood changes, suicidal thoughts, or difficulty concentrating  Endocrine: Denies polydipsia, polyuria, temperature intolerance, or history of thyroid disease      OBJECTIVE:    Physical Exam:    /79 (BP Location: Right arm, Patient Position: Sitting, Cuff Size: Adult Large)   Pulse 85   Temp 98.4  F (36.9  C) (Oral)   Resp 16   Wt 126.4 kg (278 lb 11.2 oz)   SpO2 97%   BMI 50.96 kg/m      General: Alert non-toxic appearing female in no acute distress  HEENT: Normocephalic, atraumatic; neck supple without lymphadenopathy  Pulmonary: Lungs clear to auscultation, no increased work of breathing noted  CV/PV: Regular rate and rhythm, S1S2, no clicks, murmurs, rubs, or gallops; bilateral lower extremities without edema  GI: Normoactive bowel sounds x4 quadrants, abdomen obese, soft, non-distended, and non-tender to palpation without masses or organomegaly- exam limited by body habitus  : External genitalia and urethral meatus pink without lesions, masses, or excoriation. Vagina pink and well rugated without masses or lesions. Cervix without masses or  polyps, os patent with clear discharge and visible IUD strings. Bimanual exam reveals no masses, fullness, or cervical motion tenderness. Somewhat limited due to body habitus. Rectovaginal exam confirms these findings.  Heme: Cervical, supraclavicular, and inguinal lymph nodes without lymphadenopathy  Neuro: Grossly intact, normal gait  Skin: Appropriate color for race, warm and dry, no rashes or lesions to unclothed skin  Psych: Pleasant and interactive, affect bright, makes appropriate eye contact, thought process linear    Labs:    AFP pending   pending  Beta HCG tumor marker pending      Assessment/Plan:  1) Stage IIIC mixed germ cell tumor, left ovary: No signs of recurrence on physical exam or history, tumor markers pending. Continue yearly surveillance visits through 12/2020 per NCCN guidelines. Discussed the NCCN guidelines state tumor markers are not required after two years and we discussed risks/benefits of obtaining tumor markers- she would like to proceed with annual tumor markers with which I am in agreement. Reviewed signs and symptoms  of recurrence including but not limited to bleeding from vagina, bladder, or rectum, bloating, early satiety, swelling in the lower extremities, abdominal or lower back pain, changes in bowel or bladder patterns, shortness of breath, increased fatigue, unexplained weight loss, and night sweats. Reviewed signs and symptoms for when she should contact the clinic or seek additional care. Patient to contact the clinic with any questions or concerns in the interim.  2) Genetic testing: Does not meet criteria for testing  3) Labs: LDH, AFP, , beta HCG tumor marker  4) Health maintenance issues discussed today include to follow up with PCP for co-morbid conditions and non-gynecologic issues. We did review her pattern of weight gain (20lb in the past year, nearly 80lb in the past three years) and implications of obesity, namely obesity driven cancers.  Encouraged regular physical activity and a healthy diet.  5) Patient verbalized understanding of and agreement with plan.    15 minutes face to face time spent with patient, over 50% of which was spent in counseling and coordination of care.    WEN Reagan, FNP-C  Family Nurse Practitioner  Gynecologic Oncology  [facility]  Pager: [phone]

## 2018-12-28 ENCOUNTER — APPOINTMENT (OUTPATIENT)
Dept: LAB | Facility: CLINIC | Age: 23
End: 2018-12-28
Attending: NURSE PRACTITIONER
Payer: COMMERCIAL

## 2018-12-28 ENCOUNTER — ONCOLOGY VISIT (OUTPATIENT)
Dept: ONCOLOGY | Facility: CLINIC | Age: 23
End: 2018-12-28
Attending: NURSE PRACTITIONER
Payer: COMMERCIAL

## 2018-12-28 VITALS
OXYGEN SATURATION: 97 % | SYSTOLIC BLOOD PRESSURE: 121 MMHG | TEMPERATURE: 98.4 F | WEIGHT: 278.7 LBS | HEART RATE: 85 BPM | BODY MASS INDEX: 50.96 KG/M2 | DIASTOLIC BLOOD PRESSURE: 79 MMHG | RESPIRATION RATE: 16 BRPM

## 2018-12-28 DIAGNOSIS — Z85.43 ENCOUNTER FOR FOLLOW-UP SURVEILLANCE OF OVARIAN CANCER: Primary | ICD-10-CM

## 2018-12-28 DIAGNOSIS — Z08 ENCOUNTER FOR FOLLOW-UP SURVEILLANCE OF OVARIAN CANCER: Primary | ICD-10-CM

## 2018-12-28 DIAGNOSIS — E66.01 MORBID OBESITY (H): ICD-10-CM

## 2018-12-28 LAB
AFP SERPL-MCNC: 2.2 UG/L (ref 0–8)
CANCER AG125 SERPL-ACNC: 11 U/ML (ref 0–30)
LDH SERPL L TO P-CCNC: 312 U/L (ref 81–234)

## 2018-12-28 PROCEDURE — 83615 LACTATE (LD) (LDH) ENZYME: CPT | Performed by: NURSE PRACTITIONER

## 2018-12-28 PROCEDURE — 84702 CHORIONIC GONADOTROPIN TEST: CPT | Performed by: NURSE PRACTITIONER

## 2018-12-28 PROCEDURE — 99213 OFFICE O/P EST LOW 20 MIN: CPT | Mod: ZP | Performed by: NURSE PRACTITIONER

## 2018-12-28 PROCEDURE — 36415 COLL VENOUS BLD VENIPUNCTURE: CPT

## 2018-12-28 PROCEDURE — G0463 HOSPITAL OUTPT CLINIC VISIT: HCPCS | Mod: ZF

## 2018-12-28 PROCEDURE — 82105 ALPHA-FETOPROTEIN SERUM: CPT | Performed by: NURSE PRACTITIONER

## 2018-12-28 PROCEDURE — 86304 IMMUNOASSAY TUMOR CA 125: CPT | Performed by: NURSE PRACTITIONER

## 2018-12-28 ASSESSMENT — PAIN SCALES - GENERAL: PAINLEVEL: NO PAIN (0)

## 2018-12-28 NOTE — NURSING NOTE
Chief Complaint   Patient presents with     Blood Draw     labs drawn with vpt by rn.  vs taken     Labs drawn with vpt by rn.  Pt tolerated well.  VS taken.  Pt checked in for next appt.    Lynnette Chapman RN

## 2018-12-28 NOTE — LETTER
12/28/2018       RE: Dianna Vasquez  [address]  [facility] [address]     Dear Colleague,    Thank you for referring your patient, Dianna Vasquez, to the [facility]. Please see a copy of my visit note below.    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 She completed fourth cycle of BEP 12/2015. She presents today for a three month surveillance visit.    HPI: Dianna comes to the clinic feeling well without gynecologic concern. Has a copper IUD in place, continues to have regular periods which vary in heaviness. She is sexually active with her fiance, denies dyspareunia or post-coital bleeding. Notes increased stress as she is finishing up her final year of school. Has gained 20lb this year, states this is likely due to lack of exercise and poor eating habits during times of stress.  Follows up with family practice as needed at school. Denies unintended weight loss, fatigue, weakness, changes in vision or hearing, shortness of breath, cough, chest pain, abdominal pain, dyspepsia, nausea, vomiting, constipation, diarrhea, bloating, dysuria, urinary frequency or urgency, hematuria, pelvic pain, lower back pain, vaginal discharge,numbness or tingling, or swelling of the extremities.       Brief Oncology History:  GYN History:  7/30/15: She was seen in the ED due to pelvic pain. History of irregular vaginal bleeding with positive pregnancy test. Further work up with US remarkable for 20cm pelvic mass    8/12/2015 CT scan:  Pelvic mass approximately 21 cm transverse, 14 cm AP and 20 cm craniocaudal dimension. This has cystic or necrotic areas within it with thick heterogeneous soft tissue rim.This is felt to be a large tumor, probably right ovarian origin. There is an approximately 6 x 5 x 6 cm left adnexal heterogeneous mass consistent with a smaller left ovarian mass suspicious for tumor as well. Retroperitoneal adenopathy.  Small amount of free fluid in the  pelvis. No pelvic adenopathy    8/12/2015 Pelvic US  The uterus measures 6.6 x 4.2 x 4.7cm. No fibroids are present. Endometrial stripe thickness is 0.6 cm. Remaining findings as above in CT scan.    8/15/15: CEA (1.8), CA-125 ( 202), LDH (2340), hCG (51), AFP (7,592)    8/14/15: CT abdomen and pelvis   IMPRESSION:   1. Very large right abdomen and pelvis and smaller left adnexal region mass with retroperitoneal adenopathy and small ascites. Findings are compatible with malignancy, likely bilateral ovarian tumor, greater on  the right. [Critical Result: Large mid to right abdominal mass and smaller 6 cm left adnexal mass concerning for ovarian malignancy with small free fluid and periaortic adenopathy concerning for metastasis.]    8/24/15: exploratory laparotomy, tumor debulking, left salpingo-oophorectomy, complete omentectomy, left pelvic and para aortic lymph node dissection, and bladder peritoneum stripping  Cytology:   CYTOLOGIC INTERPRETATION:  A. Pelvic Washings: Positive for Malignancy  - Consistent with germ cell tumor (dysgerminoma)Specimen Adequacy: Satisfactory for evaluation.  B. Diaphragm fluid, Left Washings: Negative for malignancy Specimen Adequacy: Satisfactory for evaluation.  C. Diaphragm fluid, Right Washings: Negative for malignancy Specimen Adequacy: Satisfactory for evaluation.  Pathology:  SPECIMEN(S):  A: Bladder peritoneum  B: Left ovary and fallopian tube  C: Left fallopian tube  D: Left pelvic peritoneum  E: Omentum  F: Omental nodule  G: Lymph nodes, left periaortic  H: Lymph node, left infrarenal  I: Lymph nodes, left pelvic  J: Right bladder peritoneum nodule  K: Cul-de-sac biopsy, posterior  FINAL DIAGNOSIS:  A. Peritoneum, bladder, excision:  -Metastatic malignant germ cell tumor (fragmented, 13.1 cm in aggregate)  B. 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 Fallopian tube, left, salpingectomy:  -Smooth muscle and mesothelium  -No fallopian tube identified  -Uninvolved by tumor  D. Peritoneum, left pelvic, biopsy:  -Metastatic malignant germ cell tumor  E. Omentum, omentectomy:  -Metastatic malignant germ cell tumor, size 2 mm  F. Omentum, nodule, excision:  -Metastatic malignant germ cell tumor  -Fragmented specimen (largest intact piece of tumor: 1 cm)  G. Lymph nodes, left periaortic, dissection:  -Metastatic malignant germ cell tumor in four of ten lymph nodes (4/10)  -Largest metastasis is 12 mm in greatest dimension  -Extranodal extension present  -Malignant germ cell tumor also present as detached tumor fragments, without identifiable lymph node  H. Lymph nodes, left infrarenal, dissection:  -Metastatic malignant germ cell tumor in two of five lymph nodes (2/5)  -Larger metastasis is 7 mm in greatest dimension  -Extranodal extension present  I. Lymph nodes, left pelvic, dissection:  -Metastatic malignant germ cell tumor in one of six lymph nodes (1/6)  -Metastasis is 4.5 mm in greatest dimension  J. Peritoneum, right bladder, biopsy:  -Metastatic malignant germ cell tumor (size 11 mm)  K. Peritoneum, cul de sac, biopsy:  -Metastatic malignant germ cell tumor  COMMENT:  The ovarian tumor shows a mixture of dysgerminoma (70%) and yolk sac tumor (30%). Similarly, the omental, peritoneal and lymph node metastases show both components, although yolk sac tumor composes the majority of the metastatic tumor volume. Neither embryonal carcinoma nor choriocarcinoma is identified.        Results for NIKI LEWISMARYCARMEN (MRN [id number]) as of 11/13/2015 12:22   Ref. Range  8/15/2015 08:35  9/8/2015 13:25  9/14/2015 08:08  10/5/2015 08:20  10/26/2015  11/16/2015  12/14/2015      Latest Range: 0-30 U/mL  202 (H)  62 (H)  53 (H)  14  12  14  11      Results for  FREDERICK LEWIS (MRN [id number]) as of 2015 12:22   Ref. Range  8/15/2015 08:35  2015 13:25  2015 08:08  10/5/2015 08:20  10/26/15  11/16/15  [id number]/[id number]    Alpha Fetoprotein  Latest Range: 0-8 ug/L  7592.7 (H)  254.5 (H)  241.1 (H)  75.0 (H)  6.1  3.3  2.8        9/14/15 Cycle 1 BEP   9/29/15 Cycle 2 BEP  10/5/15: C2D5 Etoposide cisplatin, bleomycin   10/19/15: C2D15 bleomycin  10/21/15: C2D17 BEP, doing well  11/13/15: C3D17 BEP  11/23/15: C4D1 BEP   11/30/15: C4 D15 BEP  CT c/a/p  IMPRESSION:   1. Postsurgical changes status post removal of the mixed germ cell  tumor and left salpingo-oophorectomy with lymph node dissection as  above.  2. Unchanged left apical 4 mm groundglass pleural-based nodule. No  definite evidence of recurrence or metastasis in chest, abdomen, or pelvis    3/17/16: Surveillance visit. Pap NIL. , AFP 2.9,  8, and beta HCG <3  16: AFP 2.8, beta HCG <3,  10,   16: AFP 2.4, beta HCG <3,  10,   16: AFP 2.1, beta HCG <3,  18; , Pap NIL, HPV negative  3/10/17: , AFP 3.7, beta HCG <3,  12  17: ,  11, beta HCG <3, AFP <1.5  17: ,  11, beta HCG <3, AFP 1.5  17: ,  8, beta HCG <3, AFP 1.9    Past Medical History:   Diagnosis Date     Contact dermatitis and other eczema, due to unspecified cause      Other  infants, unspecified (weight)(765.10)     born about 35 week - in NICU for 2 weeks - breathing issues.      Pelvic mass        Past Surgical History:   Procedure Laterality Date     LAPAROTOMY, TUMOR DEBULKING, COMBINED N/A 2015    Procedure: COMBINED LAPAROTOMY, TUMOR DEBULKING;  Surgeon: Vita Jordan MD;  Location: [facility] OR     OMENTECTOMY N/A 2015    Procedure: OMENTECTOMY;  Surgeon: Vita Jordan MD;  Location: [facility] OR     SALPINGO-OOPHORECTOMY, DISSECT LYMPH NODE Left 2015    Procedure: SALPINGO-OOPHORECTOMY, DISSECT LYMPH NODE;   Surgeon: Vita Jordan MD;  Location: [facility] OR     SURGICAL HISTORY OF -       no surgeries       Social History     Socioeconomic History     Marital status: Single     Spouse name: Not on file     Number of children: Not on file     Years of education: Not on file     Highest education level: Not on file   Social Needs     Financial resource strain: Not on file     Food insecurity - worry: Not on file     Food insecurity - inability: Not on file     Transportation needs - medical: Not on file     Transportation needs - non-medical: Not on file   Occupational History     Not on file   Tobacco Use     Smoking status: Never Smoker     Smokeless tobacco: Never Used   Substance and Sexual Activity     Alcohol use: Yes     Alcohol/week: 0.0 oz     Comment: occasional     Drug use: No     Sexual activity: Yes     Partners: Male   Other Topics Concern     Parent/sibling w/ CABG, MI or angioplasty before 65F 55M? Not Asked   Social History Narrative     Not on file       Family History   Problem Relation Age of Onset     C.A.D. No family hx of      Hypertension No family hx of      Asthma No family hx of      Breast Cancer No family hx of      Cancer - colorectal No family hx of      Blood Disease No family hx of         no DVT/PE      Anesthesia Reaction No family hx of      Diabetes Father        Current Outpatient Medications   Medication     UNABLE TO FIND     No current facility-administered medications for this visit.           Allergies   Allergen Reactions     Cats          ROS  General: + weight gain. Denies fatigue, weakness, appetite changes, night sweats, hot flashes, fever, chills, or difficulty sleeping  HEENT: Denies headaches, tinnitus, hair loss, visual difficulty or disturbances, spots or floaters, diplopia, masses, head injury, tinnitus, hearing loss, epistaxis, congestion, problems with teeth or gums, dysphonia, or dysphagia  Pulmonary: Denies cough, sputum, hemoptysis, shortness of breath, dyspnea on  exertion, wheezing, or allergies  Cardiovascular: Denies chest pain, fainting, palpitations, murmurs, activity intolerance, swelling in legs, or high blood pressure  Gastrointestinal: Denies nausea, vomiting, constipation, diarrhea, abdominal pain, bloating, heartburn, melena, hematochezia, or jaundice  Genitourinary: Denies dysuria, urinary urgency or frequency, hematuria, cloudy or malodorous urine, incontinence, repeat urinary tract infections, flank pain, pelvic pain, irregular vaginal bleeding, vaginal discharge, or vaginal dryness  Sexual Function: Denies pain with intercourse, changes in libido, arousal difficulty, or changes in orgasm  Integumentary: Denies rashes, sores, changing moles, or scarring  Hematologic: Denies swollen lymph nodes, masses, easy bruising, or easy bleeding  Musculoskeletal: Denies falls, back pain, myalgias, arthralgias, stiffness, muscle weakness or muscle cramps  Neurologic: Denies numbness or tingling, changes in memory, difficulty with walking, dizziness, seizures, or tremors  Psychiatric: Denies anxiety, depression, nervousness, mood changes, suicidal thoughts, or difficulty concentrating  Endocrine: Denies polydipsia, polyuria, temperature intolerance, or history of thyroid disease      OBJECTIVE:    Physical Exam:    /79 (BP Location: Right arm, Patient Position: Sitting, Cuff Size: Adult Large)   Pulse 85   Temp 98.4  F (36.9  C) (Oral)   Resp 16   Wt 126.4 kg (278 lb 11.2 oz)   SpO2 97%   BMI 50.96 kg/m       General: Alert non-toxic appearing female in no acute distress  HEENT: Normocephalic, atraumatic; neck supple without lymphadenopathy  Pulmonary: Lungs clear to auscultation, no increased work of breathing noted  CV/PV: Regular rate and rhythm, S1S2, no clicks, murmurs, rubs, or gallops; bilateral lower extremities without edema  GI: Normoactive bowel sounds x4 quadrants, abdomen obese, soft, non-distended, and non-tender to palpation without masses or  organomegaly- exam limited by body habitus  : External genitalia and urethral meatus pink without lesions, masses, or excoriation. Vagina pink and well rugated without masses or lesions. Cervix without masses or polyps, os patent with clear discharge and visible IUD strings. Bimanual exam reveals no masses, fullness, or cervical motion tenderness. Somewhat limited due to body habitus. Rectovaginal exam confirms these findings.  Heme: Cervical, supraclavicular, and inguinal lymph nodes without lymphadenopathy  Neuro: Grossly intact, normal gait  Skin: Appropriate color for race, warm and dry, no rashes or lesions to unclothed skin  Psych: Pleasant and interactive, affect bright, makes appropriate eye contact, thought process linear    Labs:    AFP pending   pending  Beta HCG tumor marker pending      Assessment/Plan:  1) Stage IIIC mixed germ cell tumor, left ovary: No signs of recurrence on physical exam or history, tumor markers pending. Continue yearly surveillance visits through 12/2020 per NCCN guidelines. Discussed the NCCN guidelines state tumor markers are not required after two years and we discussed risks/benefits of obtaining tumor markers- she would like to proceed with annual tumor markers with which I am in agreement. Reviewed signs and symptoms  of recurrence including but not limited to bleeding from vagina, bladder, or rectum, bloating, early satiety, swelling in the lower extremities, abdominal or lower back pain, changes in bowel or bladder patterns, shortness of breath, increased fatigue, unexplained weight loss, and night sweats. Reviewed signs and symptoms for when she should contact the clinic or seek additional care. Patient to contact the clinic with any questions or concerns in the interim.  2) Genetic testing: Does not meet criteria for testing  3) Labs: LDH, AFP, , beta HCG tumor marker  4) Health maintenance issues discussed today include to follow up with PCP for  co-morbid conditions and non-gynecologic issues. We did review her pattern of weight gain (20lb in the past year, nearly 80lb in the past three years) and implications of obesity, namely obesity driven cancers. Encouraged regular physical activity and a healthy diet.  5) Patient verbalized understanding of and agreement with plan.    15 minutes face to face time spent with patient, over 50% of which was spent in counseling and coordination of care.      WEN Reagan CNP

## 2018-12-31 LAB — HCG-TM SERPL-ACNC: <3 IU/L

## 2019-11-07 ENCOUNTER — HEALTH MAINTENANCE LETTER (OUTPATIENT)
Age: 24
End: 2019-11-07

## 2020-11-29 ENCOUNTER — HEALTH MAINTENANCE LETTER (OUTPATIENT)
Age: 25
End: 2020-11-29

## 2021-08-25 NOTE — NURSING NOTE
"Oncology Rooming Note    December 28, 2018 1:17 PM   Dianna Vasquez is a 23 year old female who presents for:    Chief Complaint   Patient presents with     Blood Draw     labs drawn with vpt by rn.  vs taken     Oncology Clinic Visit     Ovarian Ca , labs      Initial Vitals: /79 (BP Location: Right arm, Patient Position: Sitting, Cuff Size: Adult Large)   Pulse 85   Temp 98.4  F (36.9  C) (Oral)   Resp 16   Wt 126.4 kg (278 lb 11.2 oz)   SpO2 97%   BMI 50.96 kg/m   Estimated body mass index is 50.96 kg/m  as calculated from the following:    Height as of 9/8/17: 1.575 m (5' 2.01\").    Weight as of this encounter: 126.4 kg (278 lb 11.2 oz). Body surface area is 2.35 meters squared.  No Pain (0) Comment: Data Unavailable   No LMP recorded.  Allergies reviewed: Yes  Medications reviewed: Yes    Medications: Medication refills not needed today.  Pharmacy name entered into [facility]:    [facility] - [place], [place] - [address] [place] [address] AT [facility] [address] [place] [address] [facility] [place] - [place], [place] - [address] [facility] [place] - [place], [place] - [address] [facility] - MAIL ORDER ONLY - [place], [place]    Clinical concerns:      Rosita was notified.    5  minutes for nursing intake (face to face time)     Maki Allan MA              " Patient

## 2021-09-25 ENCOUNTER — HEALTH MAINTENANCE LETTER (OUTPATIENT)
Age: 26
End: 2021-09-25

## 2022-01-15 ENCOUNTER — HEALTH MAINTENANCE LETTER (OUTPATIENT)
Age: 27
End: 2022-01-15

## 2022-12-26 ENCOUNTER — HEALTH MAINTENANCE LETTER (OUTPATIENT)
Age: 27
End: 2022-12-26

## 2023-04-16 ENCOUNTER — HEALTH MAINTENANCE LETTER (OUTPATIENT)
Age: 28
End: 2023-04-16